# Patient Record
Sex: MALE | Race: WHITE | NOT HISPANIC OR LATINO | Employment: OTHER | URBAN - NONMETROPOLITAN AREA
[De-identification: names, ages, dates, MRNs, and addresses within clinical notes are randomized per-mention and may not be internally consistent; named-entity substitution may affect disease eponyms.]

---

## 2017-01-31 ENCOUNTER — OFFICE VISIT (OUTPATIENT)
Dept: ORTHOPEDIC SURGERY | Facility: CLINIC | Age: 52
End: 2017-01-31

## 2017-01-31 DIAGNOSIS — M25.551 RIGHT HIP PAIN: Primary | ICD-10-CM

## 2017-01-31 DIAGNOSIS — M16.31 OSTEOARTHRITIS RESULTING FROM RIGHT HIP DYSPLASIA: ICD-10-CM

## 2017-01-31 PROCEDURE — 73502 X-RAY EXAM HIP UNI 2-3 VIEWS: CPT | Performed by: ORTHOPAEDIC SURGERY

## 2017-01-31 PROCEDURE — 99203 OFFICE O/P NEW LOW 30 MIN: CPT | Performed by: ORTHOPAEDIC SURGERY

## 2017-01-31 RX ORDER — BENAZEPRIL HYDROCHLORIDE 40 MG/1
40 TABLET, FILM COATED ORAL EVERY MORNING
COMMUNITY
Start: 2017-01-26

## 2017-01-31 RX ORDER — AMLODIPINE BESYLATE 10 MG/1
10 TABLET ORAL EVERY EVENING
COMMUNITY
Start: 2017-01-26

## 2017-01-31 RX ORDER — TRAMADOL HYDROCHLORIDE 50 MG/1
50 TABLET ORAL NIGHTLY
Qty: 40 TABLET | Refills: 0 | Status: SHIPPED | OUTPATIENT
Start: 2017-01-31 | End: 2017-11-01 | Stop reason: HOSPADM

## 2017-01-31 RX ORDER — ALBUTEROL SULFATE 2.5 MG/3ML
SOLUTION RESPIRATORY (INHALATION)
COMMUNITY
Start: 2016-12-14 | End: 2017-10-30

## 2017-01-31 RX ORDER — OMEPRAZOLE 40 MG/1
40 CAPSULE, DELAYED RELEASE ORAL AS NEEDED
COMMUNITY
Start: 2016-11-29

## 2017-01-31 RX ORDER — CYCLOBENZAPRINE HCL 10 MG
10 TABLET ORAL 2 TIMES DAILY PRN
COMMUNITY
Start: 2017-01-26

## 2017-01-31 RX ORDER — GABAPENTIN 400 MG/1
400 CAPSULE ORAL 3 TIMES DAILY
COMMUNITY
Start: 2017-01-15 | End: 2018-03-19

## 2017-01-31 RX ORDER — IBUPROFEN 800 MG/1
800 TABLET ORAL EVERY 6 HOURS PRN
COMMUNITY
Start: 2016-11-30 | End: 2017-11-01 | Stop reason: HOSPADM

## 2017-01-31 RX ORDER — METOPROLOL TARTRATE 50 MG/1
50 TABLET, FILM COATED ORAL EVERY 12 HOURS SCHEDULED
COMMUNITY
Start: 2017-01-26

## 2017-01-31 RX ORDER — ACETAMINOPHEN AND CODEINE PHOSPHATE 300; 30 MG/1; MG/1
TABLET ORAL
COMMUNITY
Start: 2016-11-30 | End: 2017-10-30

## 2017-02-04 PROBLEM — M16.31 OSTEOARTHRITIS RESULTING FROM RIGHT HIP DYSPLASIA: Status: ACTIVE | Noted: 2017-02-04

## 2017-02-20 DIAGNOSIS — M16.31 OSTEOARTHRITIS RESULTING FROM RIGHT HIP DYSPLASIA: Primary | ICD-10-CM

## 2017-02-27 ENCOUNTER — APPOINTMENT (OUTPATIENT)
Dept: GENERAL RADIOLOGY | Facility: HOSPITAL | Age: 52
End: 2017-02-27
Attending: ORTHOPAEDIC SURGERY

## 2017-03-10 ENCOUNTER — HOSPITAL ENCOUNTER (OUTPATIENT)
Dept: GENERAL RADIOLOGY | Facility: HOSPITAL | Age: 52
Discharge: HOME | End: 2017-03-10
Attending: ORTHOPAEDIC SURGERY | Admitting: ORTHOPAEDIC SURGERY

## 2017-03-10 DIAGNOSIS — M16.31 OSTEOARTHRITIS RESULTING FROM RIGHT HIP DYSPLASIA: ICD-10-CM

## 2017-03-10 PROCEDURE — 25010000002 METHYLPREDNISOLONE PER 80 MG: Performed by: RADIOLOGY

## 2017-03-10 PROCEDURE — 0 IOPAMIDOL 61 % SOLUTION: Performed by: RADIOLOGY

## 2017-03-10 PROCEDURE — 77002 NEEDLE LOCALIZATION BY XRAY: CPT

## 2017-03-10 RX ORDER — LIDOCAINE WITH 8.4% SOD BICARB 0.9%(10ML)
1 SYRINGE (ML) INJECTION ONCE
Status: COMPLETED | OUTPATIENT
Start: 2017-03-10 | End: 2017-03-10

## 2017-03-10 RX ORDER — METHYLPREDNISOLONE ACETATE 80 MG/ML
80 INJECTION, SUSPENSION INTRA-ARTICULAR; INTRALESIONAL; INTRAMUSCULAR; SOFT TISSUE ONCE
Status: COMPLETED | OUTPATIENT
Start: 2017-03-10 | End: 2017-03-10

## 2017-03-10 RX ORDER — BUPIVACAINE HYDROCHLORIDE 2.5 MG/ML
10 INJECTION, SOLUTION EPIDURAL; INFILTRATION; INTRACAUDAL ONCE
Status: COMPLETED | OUTPATIENT
Start: 2017-03-10 | End: 2017-03-10

## 2017-03-10 RX ADMIN — IOPAMIDOL 1 ML: 612 INJECTION, SOLUTION INTRAVENOUS at 11:00

## 2017-03-10 RX ADMIN — METHYLPREDNISOLONE ACETATE 80 MG: 80 INJECTION, SUSPENSION INTRA-ARTICULAR; INTRALESIONAL; INTRAMUSCULAR; SOFT TISSUE at 11:00

## 2017-03-10 RX ADMIN — Medication 4 ML: at 11:00

## 2017-03-10 RX ADMIN — BUPIVACAINE HYDROCHLORIDE 5 ML: 2.5 INJECTION, SOLUTION EPIDURAL; INFILTRATION; INTRACAUDAL; PERINEURAL at 11:00

## 2017-03-23 ENCOUNTER — OFFICE VISIT (OUTPATIENT)
Dept: CARDIOLOGY | Facility: CLINIC | Age: 52
End: 2017-03-23

## 2017-03-23 VITALS
DIASTOLIC BLOOD PRESSURE: 70 MMHG | SYSTOLIC BLOOD PRESSURE: 120 MMHG | HEIGHT: 63 IN | BODY MASS INDEX: 43.3 KG/M2 | WEIGHT: 244.4 LBS | HEART RATE: 68 BPM

## 2017-03-23 DIAGNOSIS — R06.02 SHORTNESS OF BREATH: ICD-10-CM

## 2017-03-23 DIAGNOSIS — R07.2 PRECORDIAL PAIN: ICD-10-CM

## 2017-03-23 DIAGNOSIS — Z01.810 ENCOUNTER FOR PRE-OPERATIVE CARDIOVASCULAR CLEARANCE: Primary | ICD-10-CM

## 2017-03-23 DIAGNOSIS — I10 ESSENTIAL HYPERTENSION: ICD-10-CM

## 2017-03-23 DIAGNOSIS — M16.31 OSTEOARTHRITIS RESULTING FROM RIGHT HIP DYSPLASIA: ICD-10-CM

## 2017-03-23 PROCEDURE — 99243 OFF/OP CNSLTJ NEW/EST LOW 30: CPT | Performed by: NURSE PRACTITIONER

## 2017-03-23 PROCEDURE — 93000 ELECTROCARDIOGRAM COMPLETE: CPT | Performed by: NURSE PRACTITIONER

## 2017-03-23 RX ORDER — PRAVASTATIN SODIUM 40 MG
40 TABLET ORAL NIGHTLY
COMMUNITY

## 2017-03-23 RX ORDER — PHENTERMINE HYDROCHLORIDE 37.5 MG/1
TABLET ORAL
Refills: 0 | COMMUNITY
Start: 2017-03-16 | End: 2017-10-30

## 2017-03-31 ENCOUNTER — HOSPITAL ENCOUNTER (OUTPATIENT)
Dept: CARDIOLOGY | Facility: HOSPITAL | Age: 52
Discharge: HOME | End: 2017-03-31
Admitting: NURSE PRACTITIONER

## 2017-03-31 ENCOUNTER — HOSPITAL ENCOUNTER (OUTPATIENT)
Dept: CARDIOLOGY | Facility: HOSPITAL | Age: 52
Discharge: HOME | End: 2017-03-31

## 2017-03-31 VITALS
BODY MASS INDEX: 43.23 KG/M2 | OXYGEN SATURATION: 95 % | HEIGHT: 63 IN | HEART RATE: 71 BPM | SYSTOLIC BLOOD PRESSURE: 120 MMHG | DIASTOLIC BLOOD PRESSURE: 78 MMHG | WEIGHT: 244 LBS

## 2017-03-31 DIAGNOSIS — Z01.810 ENCOUNTER FOR PRE-OPERATIVE CARDIOVASCULAR CLEARANCE: ICD-10-CM

## 2017-03-31 DIAGNOSIS — I10 ESSENTIAL HYPERTENSION: ICD-10-CM

## 2017-03-31 DIAGNOSIS — R07.2 PRECORDIAL PAIN: ICD-10-CM

## 2017-03-31 DIAGNOSIS — R06.02 SHORTNESS OF BREATH: ICD-10-CM

## 2017-03-31 DIAGNOSIS — M16.31 OSTEOARTHRITIS RESULTING FROM RIGHT HIP DYSPLASIA: ICD-10-CM

## 2017-03-31 LAB
ASCENDING AORTA: 2.7 CM
BH CV ECHO MEAS - ACS: 1.9 CM
BH CV ECHO MEAS - AO MAX PG (FULL): 1.8 MMHG
BH CV ECHO MEAS - AO MAX PG: 5.9 MMHG
BH CV ECHO MEAS - AO MEAN PG (FULL): 1.3 MMHG
BH CV ECHO MEAS - AO MEAN PG: 3.7 MMHG
BH CV ECHO MEAS - AO ROOT AREA (BSA CORRECTED): 1.4
BH CV ECHO MEAS - AO ROOT AREA: 7.3 CM^2
BH CV ECHO MEAS - AO ROOT DIAM: 3 CM
BH CV ECHO MEAS - AO V2 MAX: 121.6 CM/SEC
BH CV ECHO MEAS - AO V2 MEAN: 92.5 CM/SEC
BH CV ECHO MEAS - AO V2 VTI: 27 CM
BH CV ECHO MEAS - AVA(I,A): 2.6 CM^2
BH CV ECHO MEAS - AVA(I,D): 2.6 CM^2
BH CV ECHO MEAS - AVA(V,A): 2.7 CM^2
BH CV ECHO MEAS - AVA(V,D): 2.7 CM^2
BH CV ECHO MEAS - BSA(HAYCOCK): 2.3 M^2
BH CV ECHO MEAS - BSA: 2.1 M^2
BH CV ECHO MEAS - BZI_BMI: 43.2 KILOGRAMS/M^2
BH CV ECHO MEAS - BZI_METRIC_HEIGHT: 160 CM
BH CV ECHO MEAS - BZI_METRIC_WEIGHT: 110.7 KG
BH CV ECHO MEAS - CONTRAST EF 4CH: 65 ML/M^2
BH CV ECHO MEAS - EDV(MOD-SP4): 88.3 ML
BH CV ECHO MEAS - EDV(SP4-EL): 90.6 ML
BH CV ECHO MEAS - EDV(TEICH): 144.4 ML
BH CV ECHO MEAS - EF(CUBED): 64.4 %
BH CV ECHO MEAS - EF(MOD-SP4): 65 %
BH CV ECHO MEAS - EF(SP4-EL): 66.6 %
BH CV ECHO MEAS - EF(TEICH): 55.3 %
BH CV ECHO MEAS - ESV(MOD-SP4): 30.9 ML
BH CV ECHO MEAS - ESV(SP4-EL): 30.2 ML
BH CV ECHO MEAS - ESV(TEICH): 64.5 ML
BH CV ECHO MEAS - FS: 29.1 %
BH CV ECHO MEAS - IVS/LVPW: 1.1
BH CV ECHO MEAS - IVSD: 1 CM
BH CV ECHO MEAS - LAT PEAK E' VEL: 13 CM/SEC
BH CV ECHO MEAS - LV DIASTOLIC VOL/BSA (35-75): 42 ML/M^2
BH CV ECHO MEAS - LV MASS(C)D: 203.5 GRAMS
BH CV ECHO MEAS - LV MASS(C)DI: 96.7 GRAMS/M^2
BH CV ECHO MEAS - LV MAX PG: 4.2 MMHG
BH CV ECHO MEAS - LV MEAN PG: 2.4 MMHG
BH CV ECHO MEAS - LV SYSTOLIC VOL/BSA (12-30): 14.7 ML/M^2
BH CV ECHO MEAS - LV V1 MAX: 101.9 CM/SEC
BH CV ECHO MEAS - LV V1 MEAN: 72.5 CM/SEC
BH CV ECHO MEAS - LV V1 VTI: 22.2 CM
BH CV ECHO MEAS - LVAD AP4: 29.8 CM^2
BH CV ECHO MEAS - LVAS AP4: 15.5 CM^2
BH CV ECHO MEAS - LVIDD: 5.5 CM
BH CV ECHO MEAS - LVIDS: 3.9 CM
BH CV ECHO MEAS - LVLD AP4: 8.3 CM
BH CV ECHO MEAS - LVLS AP4: 6.7 CM
BH CV ECHO MEAS - LVOT AREA (M): 3.1 CM^2
BH CV ECHO MEAS - LVOT AREA: 3.2 CM^2
BH CV ECHO MEAS - LVOT DIAM: 2 CM
BH CV ECHO MEAS - LVPWD: 0.94 CM
BH CV ECHO MEAS - MED PEAK E' VEL: 10 CM/SEC
BH CV ECHO MEAS - MR MAX PG: 10.2 MMHG
BH CV ECHO MEAS - MR MAX VEL: 159.6 CM/SEC
BH CV ECHO MEAS - MV A DUR: 0.11 SEC
BH CV ECHO MEAS - MV A MAX VEL: 81.5 CM/SEC
BH CV ECHO MEAS - MV DEC SLOPE: 655.3 CM/SEC^2
BH CV ECHO MEAS - MV DEC TIME: 0.14 SEC
BH CV ECHO MEAS - MV E MAX VEL: 87.3 CM/SEC
BH CV ECHO MEAS - MV E/A: 1.1
BH CV ECHO MEAS - MV MAX PG: 3.2 MMHG
BH CV ECHO MEAS - MV MEAN PG: 1.6 MMHG
BH CV ECHO MEAS - MV P1/2T MAX VEL: 86.3 CM/SEC
BH CV ECHO MEAS - MV P1/2T: 38.6 MSEC
BH CV ECHO MEAS - MV V2 MAX: 90.1 CM/SEC
BH CV ECHO MEAS - MV V2 MEAN: 59.4 CM/SEC
BH CV ECHO MEAS - MV V2 VTI: 24.3 CM
BH CV ECHO MEAS - MVA P1/2T LCG: 2.5 CM^2
BH CV ECHO MEAS - MVA(P1/2T): 5.7 CM^2
BH CV ECHO MEAS - MVA(VTI): 2.9 CM^2
BH CV ECHO MEAS - PA ACC TIME: 0.14 SEC
BH CV ECHO MEAS - PA MAX PG (FULL): 1.4 MMHG
BH CV ECHO MEAS - PA MAX PG: 2.7 MMHG
BH CV ECHO MEAS - PA PR(ACCEL): 15.6 MMHG
BH CV ECHO MEAS - PA V2 MAX: 82.5 CM/SEC
BH CV ECHO MEAS - PULM A REVS DUR: 0.07 SEC
BH CV ECHO MEAS - PULM A REVS VEL: 39.7 CM/SEC
BH CV ECHO MEAS - PULM DIAS VEL: 37.4 CM/SEC
BH CV ECHO MEAS - PULM S/D: 1.3
BH CV ECHO MEAS - PULM SYS VEL: 48.6 CM/SEC
BH CV ECHO MEAS - PVA(V,A): 2.6 CM^2
BH CV ECHO MEAS - PVA(V,D): 2.6 CM^2
BH CV ECHO MEAS - QP/QS: 0.66
BH CV ECHO MEAS - RAP SYSTOLE: 8 MMHG
BH CV ECHO MEAS - RV MAX PG: 1.4 MMHG
BH CV ECHO MEAS - RV MEAN PG: 0.7 MMHG
BH CV ECHO MEAS - RV V1 MAX: 58.2 CM/SEC
BH CV ECHO MEAS - RV V1 MEAN: 38.9 CM/SEC
BH CV ECHO MEAS - RV V1 VTI: 12.7 CM
BH CV ECHO MEAS - RVOT AREA: 3.6 CM^2
BH CV ECHO MEAS - RVOT DIAM: 2.2 CM
BH CV ECHO MEAS - RVSP: 14 MMHG
BH CV ECHO MEAS - SI(AO): 93.3 ML/M^2
BH CV ECHO MEAS - SI(CUBED): 49.6 ML/M^2
BH CV ECHO MEAS - SI(LVOT): 33.6 ML/M^2
BH CV ECHO MEAS - SI(MOD-SP4): 27.3 ML/M^2
BH CV ECHO MEAS - SI(SP4-EL): 28.7 ML/M^2
BH CV ECHO MEAS - SI(TEICH): 38 ML/M^2
BH CV ECHO MEAS - SUP REN AO DIAM: 2.2 CM
BH CV ECHO MEAS - SV(AO): 196.4 ML
BH CV ECHO MEAS - SV(CUBED): 104.3 ML
BH CV ECHO MEAS - SV(LVOT): 70.7 ML
BH CV ECHO MEAS - SV(MOD-SP4): 57.4 ML
BH CV ECHO MEAS - SV(RVOT): 46.4 ML
BH CV ECHO MEAS - SV(SP4-EL): 60.3 ML
BH CV ECHO MEAS - SV(TEICH): 79.9 ML
BH CV ECHO MEAS - TAPSE (>1.6): 1.9 CM2
BH CV ECHO MEAS - TR MAX VEL: 168.3 CM/SEC
BH CV NUCLEAR PRIOR STUDY: 3
BH CV STRESS BP STAGE 1: NORMAL
BH CV STRESS COMMENTS STAGE 1: NORMAL
BH CV STRESS DOSE REGADENOSON STAGE 1: 0.4
BH CV STRESS DURATION MIN STAGE 1: 0
BH CV STRESS DURATION SEC STAGE 1: 15
BH CV STRESS HR STAGE 1: 78
BH CV STRESS PROTOCOL 1: NORMAL
BH CV STRESS RECOVERY BP: NORMAL MMHG
BH CV STRESS RECOVERY HR: 77 BPM
BH CV STRESS STAGE 1: 1
BH CV XLRA - RV BASE: 2.4 CM
BH CV XLRA - TDI S': 11 CM/SEC
E/E' RATIO: 8
LEFT ATRIUM VOLUME INDEX: 27 ML/M2
LV EF 2D ECHO EST: 65 %
LV EF NUC BP: 54 %
MAXIMAL PREDICTED HEART RATE: 170 BPM
PERCENT MAX PREDICTED HR: 45.88 %
SINUS: 2.6 CM
STJ: 2.8 CM
STRESS BASELINE BP: NORMAL MMHG
STRESS BASELINE HR: 68 BPM
STRESS PERCENT HR: 54 %
STRESS POST EXERCISE DUR SEC: 15 SEC
STRESS POST PEAK BP: NORMAL MMHG
STRESS POST PEAK HR: 78 BPM
STRESS TARGET HR: 145 BPM

## 2017-03-31 PROCEDURE — 93016 CV STRESS TEST SUPVJ ONLY: CPT | Performed by: INTERNAL MEDICINE

## 2017-03-31 PROCEDURE — 78492 MYOCRD IMG PET MLT RST&STRS: CPT

## 2017-03-31 PROCEDURE — 25010000002 PERFLUTREN (DEFINITY) 8.476 MG IN SODIUM CHLORIDE 10 ML INJECTION: Performed by: NURSE PRACTITIONER

## 2017-03-31 PROCEDURE — 0 RUBIDIUM CHLORIDE: Performed by: NURSE PRACTITIONER

## 2017-03-31 PROCEDURE — 25010000002 REGADENOSON 0.4 MG/5ML SOLUTION: Performed by: NURSE PRACTITIONER

## 2017-03-31 PROCEDURE — A9555 RB82 RUBIDIUM: HCPCS | Performed by: NURSE PRACTITIONER

## 2017-03-31 PROCEDURE — 78492 MYOCRD IMG PET MLT RST&STRS: CPT | Performed by: INTERNAL MEDICINE

## 2017-03-31 PROCEDURE — 93017 CV STRESS TEST TRACING ONLY: CPT

## 2017-03-31 PROCEDURE — 93306 TTE W/DOPPLER COMPLETE: CPT | Performed by: INTERNAL MEDICINE

## 2017-03-31 PROCEDURE — 93018 CV STRESS TEST I&R ONLY: CPT | Performed by: INTERNAL MEDICINE

## 2017-03-31 PROCEDURE — C8929 TTE W OR WO FOL WCON,DOPPLER: HCPCS

## 2017-03-31 RX ADMIN — RUBIDIUM CHLORIDE RB-82 1 DOSE: 150 INJECTION, SOLUTION INTRAVENOUS at 08:40

## 2017-03-31 RX ADMIN — REGADENOSON 0.4 MG: 0.08 INJECTION, SOLUTION INTRAVENOUS at 08:54

## 2017-03-31 RX ADMIN — RUBIDIUM CHLORIDE RB-82 1 DOSE: 150 INJECTION, SOLUTION INTRAVENOUS at 08:54

## 2017-03-31 RX ADMIN — PERFLUTREN 1.5 ML: 6.52 INJECTION, SUSPENSION INTRAVENOUS at 08:53

## 2017-04-03 ENCOUNTER — TELEPHONE (OUTPATIENT)
Dept: CARDIOLOGY | Facility: CLINIC | Age: 52
End: 2017-04-03

## 2017-04-03 NOTE — TELEPHONE ENCOUNTER
Stress test results noted to be negative for ischemia.  EF 54%.  Consistent with a low risk study.  Echocardiogram shows an EF of 65% without valvular disease.  Diastolic function is normal.  Preoperative lead her stent to PRESTON Lewis

## 2017-04-25 ENCOUNTER — OFFICE VISIT (OUTPATIENT)
Dept: ORTHOPEDIC SURGERY | Facility: CLINIC | Age: 52
End: 2017-04-25

## 2017-04-25 DIAGNOSIS — M16.31 OSTEOARTHRITIS RESULTING FROM RIGHT HIP DYSPLASIA: Primary | ICD-10-CM

## 2017-04-25 PROCEDURE — 99213 OFFICE O/P EST LOW 20 MIN: CPT | Performed by: ORTHOPAEDIC SURGERY

## 2017-04-25 NOTE — PROGRESS NOTES
Chief Complaint   Patient presents with   • Right Hip - Follow-up           HPI the patient is here today for a follow up of his right hip pain and discomfort.  Patient is having a lot of pain and discomfort over the right hip.  He has difficulty with ambulation.  Difficulty going up and down steps he is doing his best to lose weight because he and I have discussed that he really noticed a decrease his BMI before we can successfully performed a hip arthroplasty for him with an anterior approach.  He also understands that the risks of complications are much higher with a high BMI.  By his own admission he currently weighs 250 pounds and has been trying to lose weight.  I am recommending to him a multidisciplinary approach including dietary modification, left ankle changes, aggressive exercise and a bariatric consultation for possible surgical intervention.  Recently he has developed a tooth infection and we have given her prescription of Augmentin and a reminder to see his dentist ASAP to get this infection addressed and healed.        There were no vitals filed for this visit.        Review of Systems   All other systems reviewed and are negative.          Physical Exam   Constitutional: He is oriented to person, place, and time. He appears well-developed and well-nourished.   HENT:   Head: Normocephalic and atraumatic.   Eyes: EOM are normal. Pupils are equal, round, and reactive to light.   Neck: Normal range of motion. Neck supple.   Cardiovascular: Intact distal pulses.    Pulmonary/Chest: Effort normal and breath sounds normal.   Abdominal: Soft. Bowel sounds are normal.   Musculoskeletal: Normal range of motion.   Neurological: He is alert and oriented to person, place, and time. He has normal reflexes.   Skin: Skin is warm.   Psychiatric: He has a normal mood and affect. His behavior is normal. Judgment and thought content normal.   Nursing note and vitals reviewed.          Joint/Body Part Specific  Exam:  right hip. Neurovascular status is intact. Patient has a distant limp on the affected side. Internal and external rotations are associated with pain and discomfort. Anterior joint line pain and tenderness is significant. Stinchfield sign is positive. Figure of 4 sign is positive. Patient is unable to perform an active straight leg raise exam. Greater trochanter is tender. Crossover adduction test is positive. Cross body adduction is limited and painful for the patient. Patient has very significant limp and joint line tenderness anteriorly, posteriorly and medially. Dorsalis pedis and posterior tibial artery pulses are palpable. Common peroneal nerve function is well preserved.      X-RAY Report:  Previous visit x-rays are reviewed and they show advanced exam osteoarthritis with bone-on-bone appearance with what appears to be a dysplastic acetabulum.      Diagnostics:        Shreyas was seen today for follow-up.    Diagnoses and all orders for this visit:    Osteoarthritis resulting from right hip dysplasia  -     FL Guide For Pain Meds Injection Joint; Future          Procedures        Plan:    I'm making an appointment for this patient with the radiologist for an injection of the right hip with the intra-articular steroid to help his reduce his symptoms.    Appointment with a bariatric surgeon to help reduce his BMI.    Aggressive exercise program.    Tablet ibuprofen 600 mg tab 1 by mouth twice a day when necessary pain and discomfort.    Patient needs to exercise aggressively to lose weight and decrease his BMI.    Tablet Augmentin 875 mg tab 1 by mouth twice a day for his tooth abscess and an appointment with his dentist for referral to an oral surgeon to address the tooth infection.    Follow-up at my office in 3 months for further considerations for surgical intervention for the hip arthroplasty.

## 2017-05-08 DIAGNOSIS — M16.31 OSTEOARTHRITIS RESULTING FROM RIGHT HIP DYSPLASIA: Primary | ICD-10-CM

## 2017-05-15 ENCOUNTER — HOSPITAL ENCOUNTER (OUTPATIENT)
Dept: GENERAL RADIOLOGY | Facility: HOSPITAL | Age: 52
Discharge: HOME | End: 2017-05-15
Attending: ORTHOPAEDIC SURGERY | Admitting: ORTHOPAEDIC SURGERY

## 2017-05-15 DIAGNOSIS — M16.31 OSTEOARTHRITIS RESULTING FROM RIGHT HIP DYSPLASIA: ICD-10-CM

## 2017-05-15 PROCEDURE — 77002 NEEDLE LOCALIZATION BY XRAY: CPT

## 2017-05-15 PROCEDURE — 0 IOPAMIDOL 61 % SOLUTION: Performed by: RADIOLOGY

## 2017-05-15 PROCEDURE — 25010000002 METHYLPREDNISOLONE PER 80 MG: Performed by: RADIOLOGY

## 2017-05-15 RX ORDER — METHYLPREDNISOLONE ACETATE 80 MG/ML
80 INJECTION, SUSPENSION INTRA-ARTICULAR; INTRALESIONAL; INTRAMUSCULAR; SOFT TISSUE ONCE
Status: COMPLETED | OUTPATIENT
Start: 2017-05-15 | End: 2017-05-15

## 2017-05-15 RX ORDER — BUPIVACAINE HYDROCHLORIDE 2.5 MG/ML
10 INJECTION, SOLUTION EPIDURAL; INFILTRATION; INTRACAUDAL ONCE
Status: COMPLETED | OUTPATIENT
Start: 2017-05-15 | End: 2017-05-15

## 2017-05-15 RX ORDER — LIDOCAINE HYDROCHLORIDE 10 MG/ML
10 INJECTION, SOLUTION INFILTRATION; PERINEURAL ONCE
Status: COMPLETED | OUTPATIENT
Start: 2017-05-15 | End: 2017-05-15

## 2017-05-15 RX ADMIN — LIDOCAINE HYDROCHLORIDE 4 ML: 10 INJECTION, SOLUTION INFILTRATION; PERINEURAL at 12:44

## 2017-05-15 RX ADMIN — IOPAMIDOL 1 ML: 612 INJECTION, SOLUTION INTRAVENOUS at 12:45

## 2017-05-15 RX ADMIN — BUPIVACAINE HYDROCHLORIDE 5 ML: 2.5 INJECTION, SOLUTION EPIDURAL; INFILTRATION; INTRACAUDAL; PERINEURAL at 12:45

## 2017-05-15 RX ADMIN — METHYLPREDNISOLONE ACETATE 80 MG: 80 INJECTION, SUSPENSION INTRA-ARTICULAR; INTRALESIONAL; INTRAMUSCULAR; SOFT TISSUE at 12:45

## 2017-10-30 ENCOUNTER — HOSPITAL ENCOUNTER (OUTPATIENT)
Dept: GENERAL RADIOLOGY | Facility: HOSPITAL | Age: 52
Discharge: HOME | End: 2017-10-30
Admitting: ORTHOPAEDIC SURGERY

## 2017-10-30 ENCOUNTER — HOSPITAL ENCOUNTER (OUTPATIENT)
Dept: GENERAL RADIOLOGY | Facility: HOSPITAL | Age: 52
Discharge: HOME | End: 2017-10-30

## 2017-10-30 ENCOUNTER — APPOINTMENT (OUTPATIENT)
Dept: PREADMISSION TESTING | Facility: HOSPITAL | Age: 52
End: 2017-10-30

## 2017-10-30 VITALS
RESPIRATION RATE: 20 BRPM | SYSTOLIC BLOOD PRESSURE: 147 MMHG | DIASTOLIC BLOOD PRESSURE: 94 MMHG | WEIGHT: 236 LBS | TEMPERATURE: 97.9 F | HEART RATE: 97 BPM | HEIGHT: 63 IN | OXYGEN SATURATION: 97 % | BODY MASS INDEX: 41.82 KG/M2

## 2017-10-30 LAB
ALBUMIN SERPL-MCNC: 4.2 G/DL (ref 3.5–5.2)
ALBUMIN/GLOB SERPL: 1.4 G/DL
ALP SERPL-CCNC: 103 U/L (ref 39–117)
ALT SERPL W P-5'-P-CCNC: 16 U/L (ref 1–41)
ANION GAP SERPL CALCULATED.3IONS-SCNC: 12 MMOL/L
APTT PPP: 29.3 SECONDS (ref 22.7–35.4)
AST SERPL-CCNC: 12 U/L (ref 1–40)
BASOPHILS # BLD AUTO: 0.03 10*3/MM3 (ref 0–0.2)
BASOPHILS NFR BLD AUTO: 0.5 % (ref 0–1.5)
BILIRUB SERPL-MCNC: 0.5 MG/DL (ref 0.1–1.2)
BILIRUB UR QL STRIP: NEGATIVE
BUN BLD-MCNC: 10 MG/DL (ref 6–20)
BUN/CREAT SERPL: 13.5 (ref 7–25)
CALCIUM SPEC-SCNC: 9.3 MG/DL (ref 8.6–10.5)
CHLORIDE SERPL-SCNC: 100 MMOL/L (ref 98–107)
CLARITY UR: CLEAR
CO2 SERPL-SCNC: 27 MMOL/L (ref 22–29)
COLOR UR: YELLOW
CREAT BLD-MCNC: 0.74 MG/DL (ref 0.76–1.27)
DEPRECATED RDW RBC AUTO: 45.9 FL (ref 37–54)
EOSINOPHIL # BLD AUTO: 1.27 10*3/MM3 (ref 0–0.7)
EOSINOPHIL NFR BLD AUTO: 22.1 % (ref 0.3–6.2)
ERYTHROCYTE [DISTWIDTH] IN BLOOD BY AUTOMATED COUNT: 13.4 % (ref 11.5–14.5)
GFR SERPL CREATININE-BSD FRML MDRD: 112 ML/MIN/1.73
GLOBULIN UR ELPH-MCNC: 2.9 GM/DL
GLUCOSE BLD-MCNC: 88 MG/DL (ref 65–99)
GLUCOSE UR STRIP-MCNC: NEGATIVE MG/DL
HCT VFR BLD AUTO: 44.6 % (ref 40.4–52.2)
HGB BLD-MCNC: 15.1 G/DL (ref 13.7–17.6)
HGB UR QL STRIP.AUTO: NEGATIVE
IMM GRANULOCYTES # BLD: 0 10*3/MM3 (ref 0–0.03)
IMM GRANULOCYTES NFR BLD: 0 % (ref 0–0.5)
INR PPP: 1 (ref 0.9–1.1)
KETONES UR QL STRIP: NEGATIVE
LEUKOCYTE ESTERASE UR QL STRIP.AUTO: NEGATIVE
LYMPHOCYTES # BLD AUTO: 1.18 10*3/MM3 (ref 0.9–4.8)
LYMPHOCYTES NFR BLD AUTO: 20.5 % (ref 19.6–45.3)
MCH RBC QN AUTO: 31.5 PG (ref 27–32.7)
MCHC RBC AUTO-ENTMCNC: 33.9 G/DL (ref 32.6–36.4)
MCV RBC AUTO: 93.1 FL (ref 79.8–96.2)
MONOCYTES # BLD AUTO: 0.27 10*3/MM3 (ref 0.2–1.2)
MONOCYTES NFR BLD AUTO: 4.7 % (ref 5–12)
NEUTROPHILS # BLD AUTO: 3 10*3/MM3 (ref 1.9–8.1)
NEUTROPHILS NFR BLD AUTO: 52.2 % (ref 42.7–76)
NITRITE UR QL STRIP: NEGATIVE
PH UR STRIP.AUTO: 6 [PH] (ref 5–8)
PLATELET # BLD AUTO: 283 10*3/MM3 (ref 140–500)
PMV BLD AUTO: 9.3 FL (ref 6–12)
POTASSIUM BLD-SCNC: 4.6 MMOL/L (ref 3.5–5.2)
PROT SERPL-MCNC: 7.1 G/DL (ref 6–8.5)
PROT UR QL STRIP: NEGATIVE
PROTHROMBIN TIME: 12.8 SECONDS (ref 11.7–14.2)
RBC # BLD AUTO: 4.79 10*6/MM3 (ref 4.6–6)
SODIUM BLD-SCNC: 139 MMOL/L (ref 136–145)
SP GR UR STRIP: 1.02 (ref 1–1.03)
UROBILINOGEN UR QL STRIP: NORMAL
WBC NRBC COR # BLD: 5.75 10*3/MM3 (ref 4.5–10.7)

## 2017-10-30 PROCEDURE — 93010 ELECTROCARDIOGRAM REPORT: CPT | Performed by: INTERNAL MEDICINE

## 2017-10-30 ASSESSMENT — HOOS JR
HOOS JR SCORE: 0
HOOS JR SCORE: 24

## 2017-10-31 ENCOUNTER — APPOINTMENT (OUTPATIENT)
Dept: GENERAL RADIOLOGY | Facility: HOSPITAL | Age: 52
End: 2017-10-31

## 2017-10-31 ENCOUNTER — HOSPITAL ENCOUNTER (INPATIENT)
Facility: HOSPITAL | Age: 52
LOS: 1 days | Discharge: HOME HEALTH | End: 2017-11-01
Attending: ORTHOPAEDIC SURGERY | Admitting: ORTHOPAEDIC SURGERY

## 2017-10-31 ENCOUNTER — ANESTHESIA EVENT (OUTPATIENT)
Dept: PERIOP | Facility: HOSPITAL | Age: 52
End: 2017-10-31

## 2017-10-31 ENCOUNTER — ANESTHESIA (OUTPATIENT)
Dept: PERIOP | Facility: HOSPITAL | Age: 52
End: 2017-10-31

## 2017-10-31 DIAGNOSIS — Z96.641 STATUS POST TOTAL REPLACEMENT OF RIGHT HIP: Primary | ICD-10-CM

## 2017-10-31 PROBLEM — M16.11 ARTHRITIS OF RIGHT HIP: Status: ACTIVE | Noted: 2017-10-31

## 2017-10-31 PROBLEM — I10 HTN (HYPERTENSION): Status: ACTIVE | Noted: 2017-10-31

## 2017-10-31 PROBLEM — J44.9 COPD (CHRONIC OBSTRUCTIVE PULMONARY DISEASE) (HCC): Status: ACTIVE | Noted: 2017-10-31

## 2017-10-31 PROBLEM — K21.9 GERD (GASTROESOPHAGEAL REFLUX DISEASE): Status: ACTIVE | Noted: 2017-10-31

## 2017-10-31 LAB
ANION GAP SERPL CALCULATED.3IONS-SCNC: 12.4 MMOL/L
BUN BLD-MCNC: 10 MG/DL (ref 6–20)
BUN/CREAT SERPL: 11.1 (ref 7–25)
CALCIUM SPEC-SCNC: 9.1 MG/DL (ref 8.6–10.5)
CHLORIDE SERPL-SCNC: 99 MMOL/L (ref 98–107)
CO2 SERPL-SCNC: 25.6 MMOL/L (ref 22–29)
CREAT BLD-MCNC: 0.9 MG/DL (ref 0.76–1.27)
GFR SERPL CREATININE-BSD FRML MDRD: 89 ML/MIN/1.73
GLUCOSE BLD-MCNC: 161 MG/DL (ref 65–99)
POTASSIUM BLD-SCNC: 4.6 MMOL/L (ref 3.5–5.2)
SODIUM BLD-SCNC: 137 MMOL/L (ref 136–145)

## 2017-10-31 PROCEDURE — 25010000002 DEXAMETHASONE PER 1 MG: Performed by: NURSE ANESTHETIST, CERTIFIED REGISTERED

## 2017-10-31 PROCEDURE — 25010000002 FENTANYL CITRATE (PF) 100 MCG/2ML SOLUTION: Performed by: NURSE ANESTHETIST, CERTIFIED REGISTERED

## 2017-10-31 PROCEDURE — 25010000002 HYDROMORPHONE PER 4 MG: Performed by: NURSE ANESTHETIST, CERTIFIED REGISTERED

## 2017-10-31 PROCEDURE — 25010000003 CEFAZOLIN IN DEXTROSE 2-4 GM/100ML-% SOLUTION: Performed by: ORTHOPAEDIC SURGERY

## 2017-10-31 PROCEDURE — C1713 ANCHOR/SCREW BN/BN,TIS/BN: HCPCS | Performed by: ORTHOPAEDIC SURGERY

## 2017-10-31 PROCEDURE — 25010000002 KETOROLAC TROMETHAMINE PER 15 MG: Performed by: NURSE ANESTHETIST, CERTIFIED REGISTERED

## 2017-10-31 PROCEDURE — 0SR903A REPLACEMENT OF RIGHT HIP JOINT WITH CERAMIC SYNTHETIC SUBSTITUTE, UNCEMENTED, OPEN APPROACH: ICD-10-PCS | Performed by: ORTHOPAEDIC SURGERY

## 2017-10-31 PROCEDURE — 73501 X-RAY EXAM HIP UNI 1 VIEW: CPT

## 2017-10-31 PROCEDURE — 94799 UNLISTED PULMONARY SVC/PX: CPT

## 2017-10-31 PROCEDURE — 97110 THERAPEUTIC EXERCISES: CPT

## 2017-10-31 PROCEDURE — 25010000002 PROPOFOL 10 MG/ML EMULSION: Performed by: NURSE ANESTHETIST, CERTIFIED REGISTERED

## 2017-10-31 PROCEDURE — C1776 JOINT DEVICE (IMPLANTABLE): HCPCS | Performed by: ORTHOPAEDIC SURGERY

## 2017-10-31 PROCEDURE — 25010000002 MIDAZOLAM PER 1 MG: Performed by: ANESTHESIOLOGY

## 2017-10-31 PROCEDURE — 25010000002 ONDANSETRON PER 1 MG: Performed by: NURSE ANESTHETIST, CERTIFIED REGISTERED

## 2017-10-31 PROCEDURE — 25010000002 SUCCINYLCHOLINE PER 20 MG: Performed by: NURSE ANESTHETIST, CERTIFIED REGISTERED

## 2017-10-31 PROCEDURE — 94640 AIRWAY INHALATION TREATMENT: CPT

## 2017-10-31 PROCEDURE — 80048 BASIC METABOLIC PNL TOTAL CA: CPT | Performed by: ORTHOPAEDIC SURGERY

## 2017-10-31 PROCEDURE — 97162 PT EVAL MOD COMPLEX 30 MIN: CPT

## 2017-10-31 DEVICE — ACTIS DUOFIX HIP PROSTHESIS (FEMORAL STEM 12/14 TAPER CEMENTLESS SIZE 6 STD COLLAR)  CE
Type: IMPLANTABLE DEVICE | Site: ACETABULUM | Status: FUNCTIONAL
Brand: ACTIS

## 2017-10-31 DEVICE — CERAMAX CERAMIC INSERT NEUTRAL 52MM OD 36MM ID
Type: IMPLANTABLE DEVICE | Site: ACETABULUM | Status: FUNCTIONAL
Brand: CERAMAX

## 2017-10-31 DEVICE — TOTL HIP STEM DEPUY UPCHRG: Type: IMPLANTABLE DEVICE | Site: ACETABULUM | Status: FUNCTIONAL

## 2017-10-31 DEVICE — PINNACLE CANCELLOUS BONE SCREW 6.5MM X 35MM
Type: IMPLANTABLE DEVICE | Site: ACETABULUM | Status: FUNCTIONAL
Brand: PINNACLE

## 2017-10-31 DEVICE — TOTL HIP GRIPTION CUP DEPUY UPCHRG: Type: IMPLANTABLE DEVICE | Site: ACETABULUM | Status: FUNCTIONAL

## 2017-10-31 DEVICE — TOTL HIP CERAMAX DEPUY 9924854: Type: IMPLANTABLE DEVICE | Site: ACETABULUM | Status: FUNCTIONAL

## 2017-10-31 DEVICE — PINNACLE GRIPTION ACETABULAR SHELL SECTOR 52MM OD
Type: IMPLANTABLE DEVICE | Site: ACETABULUM | Status: FUNCTIONAL
Brand: PINNACLE GRIPTION

## 2017-10-31 DEVICE — BIOLOX DELTA CERAMIC FEMORAL HEAD +5.0 36MM DIA 12/14 TAPER
Type: IMPLANTABLE DEVICE | Site: ACETABULUM | Status: FUNCTIONAL
Brand: BIOLOX DELTA

## 2017-10-31 RX ORDER — ROCURONIUM BROMIDE 10 MG/ML
INJECTION, SOLUTION INTRAVENOUS AS NEEDED
Status: DISCONTINUED | OUTPATIENT
Start: 2017-10-31 | End: 2017-10-31 | Stop reason: SURG

## 2017-10-31 RX ORDER — LABETALOL HYDROCHLORIDE 5 MG/ML
5 INJECTION, SOLUTION INTRAVENOUS
Status: DISCONTINUED | OUTPATIENT
Start: 2017-10-31 | End: 2017-10-31 | Stop reason: HOSPADM

## 2017-10-31 RX ORDER — SODIUM CHLORIDE 0.9 % (FLUSH) 0.9 %
1-10 SYRINGE (ML) INJECTION AS NEEDED
Status: DISCONTINUED | OUTPATIENT
Start: 2017-10-31 | End: 2017-11-01 | Stop reason: HOSPADM

## 2017-10-31 RX ORDER — NALOXONE HCL 0.4 MG/ML
0.2 VIAL (ML) INJECTION AS NEEDED
Status: DISCONTINUED | OUTPATIENT
Start: 2017-10-31 | End: 2017-10-31 | Stop reason: HOSPADM

## 2017-10-31 RX ORDER — PANTOPRAZOLE SODIUM 40 MG/1
40 TABLET, DELAYED RELEASE ORAL EVERY MORNING
Status: DISCONTINUED | OUTPATIENT
Start: 2017-11-01 | End: 2017-11-01 | Stop reason: HOSPADM

## 2017-10-31 RX ORDER — LISINOPRIL 40 MG/1
40 TABLET ORAL DAILY
Status: DISCONTINUED | OUTPATIENT
Start: 2017-10-31 | End: 2017-11-01 | Stop reason: HOSPADM

## 2017-10-31 RX ORDER — IPRATROPIUM BROMIDE AND ALBUTEROL SULFATE 2.5; .5 MG/3ML; MG/3ML
3 SOLUTION RESPIRATORY (INHALATION) EVERY 4 HOURS PRN
Status: DISCONTINUED | OUTPATIENT
Start: 2017-10-31 | End: 2017-11-01 | Stop reason: HOSPADM

## 2017-10-31 RX ORDER — FENTANYL CITRATE 50 UG/ML
50 INJECTION, SOLUTION INTRAMUSCULAR; INTRAVENOUS
Status: DISCONTINUED | OUTPATIENT
Start: 2017-10-31 | End: 2017-10-31 | Stop reason: HOSPADM

## 2017-10-31 RX ORDER — SODIUM CHLORIDE, SODIUM LACTATE, POTASSIUM CHLORIDE, CALCIUM CHLORIDE 600; 310; 30; 20 MG/100ML; MG/100ML; MG/100ML; MG/100ML
9 INJECTION, SOLUTION INTRAVENOUS CONTINUOUS
Status: DISCONTINUED | OUTPATIENT
Start: 2017-10-31 | End: 2017-10-31 | Stop reason: HOSPADM

## 2017-10-31 RX ORDER — IPRATROPIUM BROMIDE AND ALBUTEROL SULFATE 2.5; .5 MG/3ML; MG/3ML
3 SOLUTION RESPIRATORY (INHALATION) ONCE
Status: COMPLETED | OUTPATIENT
Start: 2017-10-31 | End: 2017-10-31

## 2017-10-31 RX ORDER — HYDRALAZINE HYDROCHLORIDE 20 MG/ML
5 INJECTION INTRAMUSCULAR; INTRAVENOUS
Status: DISCONTINUED | OUTPATIENT
Start: 2017-10-31 | End: 2017-10-31 | Stop reason: HOSPADM

## 2017-10-31 RX ORDER — ONDANSETRON 2 MG/ML
INJECTION INTRAMUSCULAR; INTRAVENOUS AS NEEDED
Status: DISCONTINUED | OUTPATIENT
Start: 2017-10-31 | End: 2017-10-31 | Stop reason: SURG

## 2017-10-31 RX ORDER — ACETAMINOPHEN 325 MG/1
325 TABLET ORAL EVERY 4 HOURS PRN
Status: DISCONTINUED | OUTPATIENT
Start: 2017-10-31 | End: 2017-11-01 | Stop reason: HOSPADM

## 2017-10-31 RX ORDER — NALOXONE HCL 0.4 MG/ML
0.4 VIAL (ML) INJECTION
Status: DISCONTINUED | OUTPATIENT
Start: 2017-10-31 | End: 2017-11-01 | Stop reason: HOSPADM

## 2017-10-31 RX ORDER — GLYCOPYRROLATE 0.2 MG/ML
INJECTION INTRAMUSCULAR; INTRAVENOUS AS NEEDED
Status: DISCONTINUED | OUTPATIENT
Start: 2017-10-31 | End: 2017-10-31 | Stop reason: SURG

## 2017-10-31 RX ORDER — KETOROLAC TROMETHAMINE 30 MG/ML
INJECTION, SOLUTION INTRAMUSCULAR; INTRAVENOUS AS NEEDED
Status: DISCONTINUED | OUTPATIENT
Start: 2017-10-31 | End: 2017-10-31 | Stop reason: SURG

## 2017-10-31 RX ORDER — CEFAZOLIN SODIUM 2 G/100ML
2 INJECTION, SOLUTION INTRAVENOUS ONCE
Status: DISCONTINUED | OUTPATIENT
Start: 2017-10-31 | End: 2017-10-31 | Stop reason: HOSPADM

## 2017-10-31 RX ORDER — MIDAZOLAM HYDROCHLORIDE 1 MG/ML
2 INJECTION INTRAMUSCULAR; INTRAVENOUS
Status: DISCONTINUED | OUTPATIENT
Start: 2017-10-31 | End: 2017-10-31 | Stop reason: HOSPADM

## 2017-10-31 RX ORDER — SODIUM CHLORIDE 0.9 % (FLUSH) 0.9 %
1-10 SYRINGE (ML) INJECTION AS NEEDED
Status: DISCONTINUED | OUTPATIENT
Start: 2017-10-31 | End: 2017-10-31 | Stop reason: HOSPADM

## 2017-10-31 RX ORDER — OXYCODONE HCL 10 MG/1
10 TABLET, FILM COATED, EXTENDED RELEASE ORAL ONCE
Status: COMPLETED | OUTPATIENT
Start: 2017-10-31 | End: 2017-10-31

## 2017-10-31 RX ORDER — BISACODYL 5 MG/1
10 TABLET, DELAYED RELEASE ORAL DAILY PRN
Status: DISCONTINUED | OUTPATIENT
Start: 2017-10-31 | End: 2017-11-01 | Stop reason: HOSPADM

## 2017-10-31 RX ORDER — PROMETHAZINE HYDROCHLORIDE 25 MG/ML
12.5 INJECTION, SOLUTION INTRAMUSCULAR; INTRAVENOUS ONCE AS NEEDED
Status: DISCONTINUED | OUTPATIENT
Start: 2017-10-31 | End: 2017-10-31 | Stop reason: HOSPADM

## 2017-10-31 RX ORDER — GABAPENTIN 400 MG/1
400 CAPSULE ORAL 3 TIMES DAILY
Status: DISCONTINUED | OUTPATIENT
Start: 2017-10-31 | End: 2017-11-01 | Stop reason: HOSPADM

## 2017-10-31 RX ORDER — OXYCODONE AND ACETAMINOPHEN 7.5; 325 MG/1; MG/1
1 TABLET ORAL ONCE AS NEEDED
Status: DISCONTINUED | OUTPATIENT
Start: 2017-10-31 | End: 2017-10-31 | Stop reason: HOSPADM

## 2017-10-31 RX ORDER — DIPHENHYDRAMINE HYDROCHLORIDE 50 MG/ML
12.5 INJECTION INTRAMUSCULAR; INTRAVENOUS
Status: DISCONTINUED | OUTPATIENT
Start: 2017-10-31 | End: 2017-10-31 | Stop reason: HOSPADM

## 2017-10-31 RX ORDER — SUCCINYLCHOLINE CHLORIDE 20 MG/ML
INJECTION INTRAMUSCULAR; INTRAVENOUS AS NEEDED
Status: DISCONTINUED | OUTPATIENT
Start: 2017-10-31 | End: 2017-10-31 | Stop reason: SURG

## 2017-10-31 RX ORDER — PROPOFOL 10 MG/ML
VIAL (ML) INTRAVENOUS AS NEEDED
Status: DISCONTINUED | OUTPATIENT
Start: 2017-10-31 | End: 2017-10-31 | Stop reason: SURG

## 2017-10-31 RX ORDER — CEFAZOLIN SODIUM 2 G/100ML
2 INJECTION, SOLUTION INTRAVENOUS EVERY 8 HOURS
Status: COMPLETED | OUTPATIENT
Start: 2017-10-31 | End: 2017-10-31

## 2017-10-31 RX ORDER — ONDANSETRON 2 MG/ML
4 INJECTION INTRAMUSCULAR; INTRAVENOUS ONCE AS NEEDED
Status: DISCONTINUED | OUTPATIENT
Start: 2017-10-31 | End: 2017-10-31 | Stop reason: HOSPADM

## 2017-10-31 RX ORDER — DOCUSATE SODIUM 100 MG/1
100 CAPSULE, LIQUID FILLED ORAL 2 TIMES DAILY PRN
Status: DISCONTINUED | OUTPATIENT
Start: 2017-10-31 | End: 2017-11-01 | Stop reason: HOSPADM

## 2017-10-31 RX ORDER — PROMETHAZINE HYDROCHLORIDE 25 MG/1
25 TABLET ORAL ONCE AS NEEDED
Status: DISCONTINUED | OUTPATIENT
Start: 2017-10-31 | End: 2017-10-31 | Stop reason: HOSPADM

## 2017-10-31 RX ORDER — FLUMAZENIL 0.1 MG/ML
0.2 INJECTION INTRAVENOUS AS NEEDED
Status: DISCONTINUED | OUTPATIENT
Start: 2017-10-31 | End: 2017-10-31 | Stop reason: HOSPADM

## 2017-10-31 RX ORDER — HYDROCODONE BITARTRATE AND ACETAMINOPHEN 7.5; 325 MG/1; MG/1
1 TABLET ORAL ONCE AS NEEDED
Status: DISCONTINUED | OUTPATIENT
Start: 2017-10-31 | End: 2017-10-31 | Stop reason: HOSPADM

## 2017-10-31 RX ORDER — TRANEXAMIC ACID 100 MG/ML
INJECTION, SOLUTION INTRAVENOUS AS NEEDED
Status: DISCONTINUED | OUTPATIENT
Start: 2017-10-31 | End: 2017-10-31 | Stop reason: SURG

## 2017-10-31 RX ORDER — ACETAMINOPHEN 500 MG
1000 TABLET ORAL ONCE
Status: COMPLETED | OUTPATIENT
Start: 2017-10-31 | End: 2017-10-31

## 2017-10-31 RX ORDER — HYDROMORPHONE HYDROCHLORIDE 1 MG/ML
0.5 INJECTION, SOLUTION INTRAMUSCULAR; INTRAVENOUS; SUBCUTANEOUS
Status: DISCONTINUED | OUTPATIENT
Start: 2017-10-31 | End: 2017-10-31 | Stop reason: HOSPADM

## 2017-10-31 RX ORDER — PROMETHAZINE HYDROCHLORIDE 25 MG/1
25 SUPPOSITORY RECTAL ONCE AS NEEDED
Status: DISCONTINUED | OUTPATIENT
Start: 2017-10-31 | End: 2017-10-31 | Stop reason: HOSPADM

## 2017-10-31 RX ORDER — EPHEDRINE SULFATE 50 MG/ML
INJECTION, SOLUTION INTRAVENOUS AS NEEDED
Status: DISCONTINUED | OUTPATIENT
Start: 2017-10-31 | End: 2017-10-31 | Stop reason: SURG

## 2017-10-31 RX ORDER — FENTANYL CITRATE 50 UG/ML
INJECTION, SOLUTION INTRAMUSCULAR; INTRAVENOUS AS NEEDED
Status: DISCONTINUED | OUTPATIENT
Start: 2017-10-31 | End: 2017-10-31 | Stop reason: SURG

## 2017-10-31 RX ORDER — BENAZEPRIL HYDROCHLORIDE 20 MG/1
40 TABLET ORAL DAILY
Status: DISCONTINUED | OUTPATIENT
Start: 2017-10-31 | End: 2017-10-31 | Stop reason: CLARIF

## 2017-10-31 RX ORDER — MIDAZOLAM HYDROCHLORIDE 1 MG/ML
1 INJECTION INTRAMUSCULAR; INTRAVENOUS
Status: DISCONTINUED | OUTPATIENT
Start: 2017-10-31 | End: 2017-10-31 | Stop reason: HOSPADM

## 2017-10-31 RX ORDER — FAMOTIDINE 10 MG/ML
20 INJECTION, SOLUTION INTRAVENOUS ONCE
Status: COMPLETED | OUTPATIENT
Start: 2017-10-31 | End: 2017-10-31

## 2017-10-31 RX ORDER — ACETAMINOPHEN 325 MG/1
650 TABLET ORAL EVERY 4 HOURS PRN
Status: DISCONTINUED | OUTPATIENT
Start: 2017-10-31 | End: 2017-11-01 | Stop reason: HOSPADM

## 2017-10-31 RX ORDER — CYCLOBENZAPRINE HCL 10 MG
10 TABLET ORAL 3 TIMES DAILY PRN
Status: DISCONTINUED | OUTPATIENT
Start: 2017-10-31 | End: 2017-11-01 | Stop reason: HOSPADM

## 2017-10-31 RX ORDER — EPHEDRINE SULFATE 50 MG/ML
5 INJECTION, SOLUTION INTRAVENOUS ONCE AS NEEDED
Status: DISCONTINUED | OUTPATIENT
Start: 2017-10-31 | End: 2017-10-31 | Stop reason: HOSPADM

## 2017-10-31 RX ORDER — DIPHENHYDRAMINE HCL 25 MG
25 CAPSULE ORAL EVERY 6 HOURS PRN
Status: DISCONTINUED | OUTPATIENT
Start: 2017-10-31 | End: 2017-11-01 | Stop reason: HOSPADM

## 2017-10-31 RX ORDER — ALBUTEROL SULFATE 2.5 MG/3ML
2.5 SOLUTION RESPIRATORY (INHALATION) ONCE AS NEEDED
Status: DISCONTINUED | OUTPATIENT
Start: 2017-10-31 | End: 2017-10-31 | Stop reason: HOSPADM

## 2017-10-31 RX ORDER — CEFAZOLIN SODIUM 2 G/100ML
2 INJECTION, SOLUTION INTRAVENOUS ONCE
Status: COMPLETED | OUTPATIENT
Start: 2017-10-31 | End: 2017-10-31

## 2017-10-31 RX ORDER — LIDOCAINE HYDROCHLORIDE 20 MG/ML
INJECTION, SOLUTION INFILTRATION; PERINEURAL AS NEEDED
Status: DISCONTINUED | OUTPATIENT
Start: 2017-10-31 | End: 2017-10-31 | Stop reason: SURG

## 2017-10-31 RX ORDER — METOPROLOL TARTRATE 50 MG/1
100 TABLET, FILM COATED ORAL ONCE
Status: COMPLETED | OUTPATIENT
Start: 2017-10-31 | End: 2017-10-31

## 2017-10-31 RX ORDER — HYDROMORPHONE HCL 110MG/55ML
PATIENT CONTROLLED ANALGESIA SYRINGE INTRAVENOUS AS NEEDED
Status: DISCONTINUED | OUTPATIENT
Start: 2017-10-31 | End: 2017-10-31 | Stop reason: SURG

## 2017-10-31 RX ORDER — SODIUM CHLORIDE 9 MG/ML
100 INJECTION, SOLUTION INTRAVENOUS CONTINUOUS
Status: ACTIVE | OUTPATIENT
Start: 2017-10-31 | End: 2017-11-01

## 2017-10-31 RX ORDER — DEXAMETHASONE SODIUM PHOSPHATE 10 MG/ML
INJECTION INTRAMUSCULAR; INTRAVENOUS AS NEEDED
Status: DISCONTINUED | OUTPATIENT
Start: 2017-10-31 | End: 2017-10-31 | Stop reason: SURG

## 2017-10-31 RX ORDER — HYDROCODONE BITARTRATE AND ACETAMINOPHEN 7.5; 325 MG/1; MG/1
1 TABLET ORAL EVERY 4 HOURS PRN
Status: DISCONTINUED | OUTPATIENT
Start: 2017-10-31 | End: 2017-11-01 | Stop reason: HOSPADM

## 2017-10-31 RX ORDER — ASPIRIN 325 MG
325 TABLET, DELAYED RELEASE (ENTERIC COATED) ORAL EVERY 12 HOURS SCHEDULED
Status: DISCONTINUED | OUTPATIENT
Start: 2017-11-01 | End: 2017-11-01 | Stop reason: HOSPADM

## 2017-10-31 RX ORDER — METOPROLOL TARTRATE 50 MG/1
50 TABLET, FILM COATED ORAL DAILY
Status: DISCONTINUED | OUTPATIENT
Start: 2017-10-31 | End: 2017-11-01 | Stop reason: HOSPADM

## 2017-10-31 RX ORDER — PROMETHAZINE HYDROCHLORIDE 25 MG/1
12.5 TABLET ORAL ONCE AS NEEDED
Status: DISCONTINUED | OUTPATIENT
Start: 2017-10-31 | End: 2017-10-31 | Stop reason: HOSPADM

## 2017-10-31 RX ORDER — AMLODIPINE BESYLATE 10 MG/1
10 TABLET ORAL DAILY
Status: DISCONTINUED | OUTPATIENT
Start: 2017-10-31 | End: 2017-11-01 | Stop reason: HOSPADM

## 2017-10-31 RX ADMIN — ONDANSETRON 4 MG: 2 INJECTION INTRAMUSCULAR; INTRAVENOUS at 11:13

## 2017-10-31 RX ADMIN — GLYCOPYRROLATE 0.3 MG: 0.2 INJECTION INTRAMUSCULAR; INTRAVENOUS at 11:12

## 2017-10-31 RX ADMIN — LIDOCAINE HYDROCHLORIDE 100 MG: 20 INJECTION, SOLUTION INFILTRATION; PERINEURAL at 10:00

## 2017-10-31 RX ADMIN — PROPOFOL 180 MG: 10 INJECTION, EMULSION INTRAVENOUS at 10:00

## 2017-10-31 RX ADMIN — SODIUM CHLORIDE, POTASSIUM CHLORIDE, SODIUM LACTATE AND CALCIUM CHLORIDE 9 ML/HR: 600; 310; 30; 20 INJECTION, SOLUTION INTRAVENOUS at 08:38

## 2017-10-31 RX ADMIN — SUCCINYLCHOLINE CHLORIDE 100 MG: 20 INJECTION, SOLUTION INTRAMUSCULAR; INTRAVENOUS; PARENTERAL at 10:00

## 2017-10-31 RX ADMIN — FAMOTIDINE 20 MG: 10 INJECTION INTRAVENOUS at 08:38

## 2017-10-31 RX ADMIN — EPHEDRINE SULFATE 10 MG: 50 INJECTION INTRAMUSCULAR; INTRAVENOUS; SUBCUTANEOUS at 11:14

## 2017-10-31 RX ADMIN — GABAPENTIN 400 MG: 400 CAPSULE ORAL at 21:48

## 2017-10-31 RX ADMIN — HYDROMORPHONE HYDROCHLORIDE 0.5 MG: 1 INJECTION, SOLUTION INTRAMUSCULAR; INTRAVENOUS; SUBCUTANEOUS at 12:07

## 2017-10-31 RX ADMIN — SODIUM CHLORIDE, POTASSIUM CHLORIDE, SODIUM LACTATE AND CALCIUM CHLORIDE: 600; 310; 30; 20 INJECTION, SOLUTION INTRAVENOUS at 10:41

## 2017-10-31 RX ADMIN — CEFAZOLIN SODIUM 2 G: 2 INJECTION, SOLUTION INTRAVENOUS at 21:48

## 2017-10-31 RX ADMIN — ROCURONIUM BROMIDE 25 MG: 10 INJECTION INTRAVENOUS at 10:05

## 2017-10-31 RX ADMIN — FENTANYL CITRATE 50 MCG: 50 INJECTION INTRAMUSCULAR; INTRAVENOUS at 09:58

## 2017-10-31 RX ADMIN — CEFAZOLIN SODIUM 2 G: 2 INJECTION, SOLUTION INTRAVENOUS at 09:50

## 2017-10-31 RX ADMIN — KETOROLAC TROMETHAMINE 30 MG: 30 INJECTION, SOLUTION INTRAMUSCULAR; INTRAVENOUS at 11:27

## 2017-10-31 RX ADMIN — GLYCOPYRROLATE 0.2 MG: 0.2 INJECTION INTRAMUSCULAR; INTRAVENOUS at 09:58

## 2017-10-31 RX ADMIN — EPHEDRINE SULFATE 10 MG: 50 INJECTION INTRAMUSCULAR; INTRAVENOUS; SUBCUTANEOUS at 10:23

## 2017-10-31 RX ADMIN — OXYCODONE HYDROCHLORIDE 10 MG: 10 TABLET, FILM COATED, EXTENDED RELEASE ORAL at 07:21

## 2017-10-31 RX ADMIN — EPHEDRINE SULFATE 10 MG: 50 INJECTION INTRAMUSCULAR; INTRAVENOUS; SUBCUTANEOUS at 10:52

## 2017-10-31 RX ADMIN — EPHEDRINE SULFATE 10 MG: 50 INJECTION INTRAMUSCULAR; INTRAVENOUS; SUBCUTANEOUS at 10:43

## 2017-10-31 RX ADMIN — EPHEDRINE SULFATE 10 MG: 50 INJECTION INTRAMUSCULAR; INTRAVENOUS; SUBCUTANEOUS at 11:19

## 2017-10-31 RX ADMIN — FENTANYL CITRATE 50 MCG: 50 INJECTION INTRAMUSCULAR; INTRAVENOUS at 12:32

## 2017-10-31 RX ADMIN — ROCURONIUM BROMIDE 15 MG: 10 INJECTION INTRAVENOUS at 10:34

## 2017-10-31 RX ADMIN — METOPROLOL TARTRATE 50 MG: 50 TABLET, FILM COATED ORAL at 23:44

## 2017-10-31 RX ADMIN — ROCURONIUM BROMIDE 5 MG: 10 INJECTION INTRAVENOUS at 10:00

## 2017-10-31 RX ADMIN — FENTANYL CITRATE 50 MCG: 50 INJECTION INTRAMUSCULAR; INTRAVENOUS at 12:01

## 2017-10-31 RX ADMIN — HYDROMORPHONE HYDROCHLORIDE 0.5 MG: 2 INJECTION, SOLUTION INTRAMUSCULAR; INTRAVENOUS; SUBCUTANEOUS at 11:10

## 2017-10-31 RX ADMIN — MIDAZOLAM 2 MG: 1 INJECTION INTRAMUSCULAR; INTRAVENOUS at 08:38

## 2017-10-31 RX ADMIN — SUGAMMADEX 200 MG: 100 INJECTION, SOLUTION INTRAVENOUS at 11:12

## 2017-10-31 RX ADMIN — GABAPENTIN 400 MG: 400 CAPSULE ORAL at 15:57

## 2017-10-31 RX ADMIN — SODIUM CHLORIDE 100 ML/HR: 9 INJECTION, SOLUTION INTRAVENOUS at 15:58

## 2017-10-31 RX ADMIN — HYDROCODONE BITARTRATE AND ACETAMINOPHEN 1 TABLET: 7.5; 325 TABLET ORAL at 23:45

## 2017-10-31 RX ADMIN — IPRATROPIUM BROMIDE AND ALBUTEROL SULFATE 3 ML: .5; 3 SOLUTION RESPIRATORY (INHALATION) at 08:50

## 2017-10-31 RX ADMIN — ACETAMINOPHEN 1000 MG: 500 TABLET ORAL at 07:21

## 2017-10-31 RX ADMIN — METOPROLOL TARTRATE 100 MG: 50 TABLET, FILM COATED ORAL at 08:42

## 2017-10-31 RX ADMIN — HYDROCODONE BITARTRATE AND ACETAMINOPHEN 1 TABLET: 7.5; 325 TABLET ORAL at 14:37

## 2017-10-31 RX ADMIN — DEXAMETHASONE SODIUM PHOSPHATE 8 MG: 10 INJECTION INTRAMUSCULAR; INTRAVENOUS at 10:28

## 2017-10-31 RX ADMIN — CEFAZOLIN SODIUM 2 G: 2 INJECTION, SOLUTION INTRAVENOUS at 15:57

## 2017-10-31 RX ADMIN — TRANEXAMIC ACID 1000 MG: 100 INJECTION, SOLUTION INTRAVENOUS at 10:14

## 2017-10-31 RX ADMIN — HYDROCODONE BITARTRATE AND ACETAMINOPHEN 1 TABLET: 7.5; 325 TABLET ORAL at 19:21

## 2017-10-31 NOTE — ANESTHESIA POSTPROCEDURE EVALUATION
"Patient: Shreyas Lao    Procedure Summary     Date Anesthesia Start Anesthesia Stop Room / Location    10/31/17 0949 1147  [facility] /  [facility]       Procedure Diagnosis Surgeon Provider    RIGHT TOTAL HIP ARTHROPLASTY POSTERIOR (Right Hip) Osteoarthritis of right hip MD Jayesh Bonilla MD          Anesthesia Type: general  Last vitals  BP   141/93 (10/31/17 1230)   Temp   36.6 °C (97.9 °F) (10/31/17 1146)   Pulse   62 (10/31/17 1230)   Resp   16 (10/31/17 1230)     SpO2   96 % (10/31/17 1230)     Post Anesthesia Care and Evaluation    Patient location during evaluation: PACU  Patient participation: complete - patient participated  Level of consciousness: awake and alert  Pain management: adequate  Airway patency: patent  Anesthetic complications: No anesthetic complications    Cardiovascular status: acceptable  Respiratory status: acceptable  Hydration status: acceptable    Comments: /93 (BP Location: Right arm, Patient Position: Lying)  Pulse 62  Temp 36.6 °C (97.9 °F) (Oral)   Resp 16  Ht 63\" (160 cm)  Wt 236 lb 6.4 oz (107 kg)  SpO2 96%  BMI 41.88 kg/m2              "

## 2017-10-31 NOTE — ANESTHESIA PROCEDURE NOTES
Airway  Urgency: elective    Airway not difficult    General Information and Staff    Patient location during procedure: OR  Anesthesiologist: SRUTHI CORDERO  CRNA: OSMAR FLORES    Indications and Patient Condition  Indications for airway management: airway protection    Preoxygenated: yes  MILS maintained throughout  Mask difficulty assessment: 1 - vent by mask    Final Airway Details  Final airway type: endotracheal airway      Successful airway: ETT  Cuffed: yes   Successful intubation technique: direct laryngoscopy  Facilitating devices/methods: cricoid pressure  Endotracheal tube insertion site: oral  Blade: Cr  Blade size: #3  ETT size: 8.0 mm  Cormack-Lehane Classification: grade I - full view of glottis  Placement verified by: chest auscultation and capnometry   Inital cuff pressure (cm H2O): 22  Cuff volume (mL): 8  Measured from: lips  ETT to lips (cm): 22  Number of attempts at approach: 1

## 2017-10-31 NOTE — ANESTHESIA PREPROCEDURE EVALUATION
Anesthesia Evaluation     Patient summary reviewed and Nursing notes reviewed          Airway   Mallampati: III  possible difficult intubation  Dental    (+) poor dentition        Pulmonary    (+) pneumonia , a smoker Current, shortness of breath, wheezes,   Cardiovascular     Rhythm: regular    (+) hypertension, hyperlipidemia    ROS comment: Normal cath    Neuro/Psych  GI/Hepatic/Renal/Endo    (+) morbid obesity, GERD,     Musculoskeletal     Abdominal    Substance History      OB/GYN          Other                                    Anesthesia Plan    ASA 3     general     intravenous induction   Anesthetic plan and risks discussed with patient.    · Myocardial perfusion imaging indicates a normal myocardial perfusion study with no evidence of ischemia.  · Left ventricular ejection fraction is normal (Calculated EF = 54%).  · Impressions are consistent with a low risk study.

## 2017-11-01 VITALS
WEIGHT: 236.4 LBS | DIASTOLIC BLOOD PRESSURE: 78 MMHG | TEMPERATURE: 98 F | RESPIRATION RATE: 16 BRPM | OXYGEN SATURATION: 96 % | BODY MASS INDEX: 41.89 KG/M2 | SYSTOLIC BLOOD PRESSURE: 123 MMHG | HEIGHT: 63 IN | HEART RATE: 67 BPM

## 2017-11-01 PROBLEM — M16.11 ARTHRITIS OF RIGHT HIP: Status: RESOLVED | Noted: 2017-10-31 | Resolved: 2017-11-01

## 2017-11-01 LAB
ANION GAP SERPL CALCULATED.3IONS-SCNC: 14.8 MMOL/L
BASOPHILS # BLD AUTO: 0.01 10*3/MM3 (ref 0–0.2)
BASOPHILS NFR BLD AUTO: 0.1 % (ref 0–1.5)
BUN BLD-MCNC: 10 MG/DL (ref 6–20)
BUN/CREAT SERPL: 13.7 (ref 7–25)
CALCIUM SPEC-SCNC: 9 MG/DL (ref 8.6–10.5)
CHLORIDE SERPL-SCNC: 97 MMOL/L (ref 98–107)
CO2 SERPL-SCNC: 25.2 MMOL/L (ref 22–29)
CREAT BLD-MCNC: 0.73 MG/DL (ref 0.76–1.27)
DEPRECATED RDW RBC AUTO: 46.4 FL (ref 37–54)
EOSINOPHIL # BLD AUTO: 0.06 10*3/MM3 (ref 0–0.7)
EOSINOPHIL NFR BLD AUTO: 0.6 % (ref 0.3–6.2)
ERYTHROCYTE [DISTWIDTH] IN BLOOD BY AUTOMATED COUNT: 13.6 % (ref 11.5–14.5)
GFR SERPL CREATININE-BSD FRML MDRD: 114 ML/MIN/1.73
GLUCOSE BLD-MCNC: 138 MG/DL (ref 65–99)
HCT VFR BLD AUTO: 37.4 % (ref 40.4–52.2)
HGB BLD-MCNC: 12.3 G/DL (ref 13.7–17.6)
IMM GRANULOCYTES # BLD: 0.03 10*3/MM3 (ref 0–0.03)
IMM GRANULOCYTES NFR BLD: 0.3 % (ref 0–0.5)
LYMPHOCYTES # BLD AUTO: 0.68 10*3/MM3 (ref 0.9–4.8)
LYMPHOCYTES NFR BLD AUTO: 6.9 % (ref 19.6–45.3)
MCH RBC QN AUTO: 30.8 PG (ref 27–32.7)
MCHC RBC AUTO-ENTMCNC: 32.9 G/DL (ref 32.6–36.4)
MCV RBC AUTO: 93.5 FL (ref 79.8–96.2)
MONOCYTES # BLD AUTO: 0.82 10*3/MM3 (ref 0.2–1.2)
MONOCYTES NFR BLD AUTO: 8.3 % (ref 5–12)
NEUTROPHILS # BLD AUTO: 8.32 10*3/MM3 (ref 1.9–8.1)
NEUTROPHILS NFR BLD AUTO: 83.8 % (ref 42.7–76)
PLATELET # BLD AUTO: 292 10*3/MM3 (ref 140–500)
PMV BLD AUTO: 9.6 FL (ref 6–12)
POTASSIUM BLD-SCNC: 4.6 MMOL/L (ref 3.5–5.2)
RBC # BLD AUTO: 4 10*6/MM3 (ref 4.6–6)
SODIUM BLD-SCNC: 137 MMOL/L (ref 136–145)
WBC NRBC COR # BLD: 9.92 10*3/MM3 (ref 4.5–10.7)

## 2017-11-01 PROCEDURE — 97150 GROUP THERAPEUTIC PROCEDURES: CPT

## 2017-11-01 PROCEDURE — 25010000002 INFLUENZA VAC SUBUNIT QUAD 0.5 ML SUSPENSION PREFILLED SYRINGE: Performed by: ORTHOPAEDIC SURGERY

## 2017-11-01 PROCEDURE — 90661 CCIIV3 VAC ABX FR 0.5 ML IM: CPT | Performed by: ORTHOPAEDIC SURGERY

## 2017-11-01 PROCEDURE — G0008 ADMIN INFLUENZA VIRUS VAC: HCPCS | Performed by: ORTHOPAEDIC SURGERY

## 2017-11-01 PROCEDURE — 80048 BASIC METABOLIC PNL TOTAL CA: CPT | Performed by: ORTHOPAEDIC SURGERY

## 2017-11-01 PROCEDURE — 97165 OT EVAL LOW COMPLEX 30 MIN: CPT

## 2017-11-01 PROCEDURE — 85025 COMPLETE CBC W/AUTO DIFF WBC: CPT | Performed by: ORTHOPAEDIC SURGERY

## 2017-11-01 PROCEDURE — 97110 THERAPEUTIC EXERCISES: CPT

## 2017-11-01 PROCEDURE — 94799 UNLISTED PULMONARY SVC/PX: CPT

## 2017-11-01 RX ORDER — HYDROCODONE BITARTRATE AND ACETAMINOPHEN 7.5; 325 MG/1; MG/1
1 TABLET ORAL EVERY 4 HOURS PRN
Qty: 72 TABLET | Refills: 0 | Status: SHIPPED | OUTPATIENT
Start: 2017-11-01 | End: 2017-11-11

## 2017-11-01 RX ADMIN — HYDROCODONE BITARTRATE AND ACETAMINOPHEN 1 TABLET: 7.5; 325 TABLET ORAL at 05:47

## 2017-11-01 RX ADMIN — HYDROCODONE BITARTRATE AND ACETAMINOPHEN 1 TABLET: 7.5; 325 TABLET ORAL at 10:02

## 2017-11-01 RX ADMIN — PANTOPRAZOLE SODIUM 40 MG: 40 TABLET, DELAYED RELEASE ORAL at 08:10

## 2017-11-01 RX ADMIN — GABAPENTIN 400 MG: 400 CAPSULE ORAL at 08:49

## 2017-11-01 RX ADMIN — ASPIRIN 325 MG: 325 TABLET, DELAYED RELEASE ORAL at 08:49

## 2017-11-01 RX ADMIN — METOPROLOL TARTRATE 50 MG: 50 TABLET, FILM COATED ORAL at 08:49

## 2017-11-01 RX ADMIN — A/SINGAPORE/GP1908/2015 IVR-180 (H1N1) (AN A/MICHIGAN/45/2015-LIKE VIRUS), A/SINGAPORE/GP2050/2015 (H3N2) (AN A/HONG KONG/4801/2014 - LIKE VIRUS), B/UTAH/9/2014 (A B/PHUKET/3073/2013-LIKE VIRUS), B/HONG KONG/259/2010 (A B/BRISBANE/60/08-LIKE VIRUS) 0.5 ML: 15; 15; 15; 15 INJECTION, SUSPENSION INTRAMUSCULAR at 11:12

## 2017-11-01 NOTE — THERAPY DISCHARGE NOTE
Acute Care - Occupational Therapy Initial Eval/Discharge  [facility]     Patient Name: Shreyas Lao  : 1965  MRN: [id number]  Today's Date: 2017  Onset of Illness/Injury or Date of Surgery Date: 10/31/17 (s/p R RAUL)     Referring Physician: Dr. Valdez      Admit Date: 10/31/2017       ICD-10-CM ICD-9-CM   1. 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   • COPD (chronic obstructive pulmonary disease)   • HTN (hypertension)   • GERD (gastroesophageal reflux disease)     Past Medical History:   Diagnosis Date   • Abdominal pain    • Acid reflux    • Benign essential hypertension    • Bradycardia    • Chest pain    • COPD (chronic obstructive pulmonary disease)    • Cough    • Dyslipidemia    • Fatigue    • Hip dysplasia    • Hyperlipidemia    • Hypertension    • Infection of skin    • Infection of tooth    • Obesity    • Osteoarthritis    • Physical exam    • Pleurisy    • Pneumonia    • Preoperative clearance    • Radicular pain of right lower extremity    • Right hip pain    • Risk factors for obstructive sleep apnea    • Sciatic pain    • SOB (shortness of breath)    • Tobacco abuse    • Weight gain, abnormal      Past Surgical History:   Procedure Laterality Date   • CARDIAC CATHETERIZATION  2010    Completed by Dr. Osei Stevens which showed no significant stenosis of any of the coronary arteries   • TOTAL HIP ARTHROPLASTY Right 10/31/2017    Procedure: RIGHT TOTAL HIP ARTHROPLASTY POSTERIOR;  Surgeon: Jenna Valdez MD;  Location: [facility];  Service:           OT ASSESSMENT FLOWSHEET (last 72 hours)      OT Evaluation       17 0956 17 0949 17 0916 17 0915 17 0420    Rehab Evaluation    Document Type  evaluation  -HC evaluation  -HC      Subjective Information  agree to therapy;no complaints  -HC agree to therapy;no complaints  -HC       Patient Effort, Rehab Treatment  good  -HC good  -HC      Symptoms Noted During/After Treatment  none  -HC none  -HC      General Information    Patient Profile Review  yes  -HC yes  -HC      General Observations  reclined in bedside chair in no acute distress  -HC reclined in bedside chair in no acute distress  -HC      Precautions/Limitations  fall precautions;hip precautions- left  -HC fall precautions;hip precautions- right  -HC      Prior Level of Function  independent:;ADL's  -HC independent:;ADL's  -HC      Equipment Currently Used at Home  walker, standard  -HC walker, standard  -HC  walker, standard  -QN    Plans/Goals Discussed With  spouse/S.O.  -HC spouse/S.O.  -HC      Risks Reviewed  spouse/S.O.:  -HC spouse/S.O.:  -HC      Benefits Reviewed  spouse/S.O.:  -HC spouse/S.O.:  -HC      Living Environment    Lives With  spouse  -HC spouse  -HC spouse  -VA     Living Arrangements  house  -HC house  - house  -VA     Home Accessibility  no concerns  -HC no concerns  -HC no concerns  -VA     Number of Stairs to Enter Home  1  -HC 1  -HC 1  -VA     Stair Railings at Home  outside, present on left side  - outside, present on left side  - outside, present on left side  -VA     Type of Financial/Environmental Concern  none  -HC none  -HC none  -VA     Transportation Available  car;family or friend will provide  - car;family or friend will provide  - car;family or friend will provide  -VA car;family or friend will provide  -QN    Clinical Impression    Patient/Family Goals Statement  to return with [facility] to return home with [facility]      Criteria for Skilled Therapeutic Interventions Met  no  -HC no  -HC      Anticipated Discharge Disposition home with home health  -HC home with home health  - home with home health  -      Functional Level Prior    Ambulation   1-->assistive equipment  -VA      Transferring   0-->independent  -VA      Toileting   0-->independent  -VA      Bathing    0-->independent  -VA      Dressing   0-->independent  -VA      Eating   0-->independent  -VA      Communication   0-->understands/communicates without difficulty  -VA      Swallowing   0-->swallows foods/liquids without difficulty  -VA      Pain Assessment    Pain Assessment  No/denies pain  -       Vision Assessment/Intervention    Visual Impairment  WFL  -       Cognitive Assessment/Intervention    Current Cognitive/Communication Assessment  functional  -       Orientation Status  oriented x 4  -HC       Follows Commands/Answers Questions  100% of the time  -       Personal Safety  WNL/WFL  -HC       Personal Safety Interventions  fall prevention program maintained;gait belt;nonskid shoes/slippers when out of bed  -       ROM (Range of Motion)    General ROM Detail  E [facility]  -       MMT (Manual Muscle Testing)    General MMT Assessment Detail  E [facility]  -       Bed Mobility, Assessment/Treatment    Bed Mobility, Comment  up in chair  -       Lower Body Bathing Assessment/Training    LB Bathing Assess/Train, Comment  pt and spouse educated on hip kit and requested OT order one. Pt's spouse will be available for assist but pt reports he wants to be as independent as possible. Pt able to demo good carryover of education of AE.  -HC       Positioning and Restraints    Pre-Treatment Position  sitting in chair/recliner  -HC       Post Treatment Position  chair  -HC       In Chair  reclined;call light within reach;encouraged to call for assist;with family/caregiver;RLE elevated  -         10/31/17 1500 10/31/17 1336 10/31/17 1335 10/31/17 0722 10/31/17 0702    Rehab Evaluation    Document Type evaluation  -MA        Subjective Information agree to therapy;complains of;fatigue;weakness   drowsiness noted  -MA        Patient Effort, Rehab Treatment good  -MA        Symptoms Noted During/After Treatment increased pain  -MA        General Information    Patient Profile Review yes  -MA        Onset of  Illness/Injury or Date of Surgery Date 10/31/17   s/p R RAUL  -MA        Referring Physician Dr. Valdez  -MA        General Observations supine in bed with HOB elevated, SCD, abductor pillow, no acute distress noted  -MA        Precautions/Limitations fall precautions;hip precautions- right  -MA        Prior Level of Function independent:;all household mobility;using stairs  -MA        Equipment Currently Used at Home cane, straight  -MA  cane, straight  -JT cane, straight  -DK cane, straight  -DK    Plans/Goals Discussed With patient;spouse/S.O.  -MA        Barriers to Rehab none identified  -MA        Living Environment    Lives With  spouse  -JT   spouse  -DK    Living Arrangements  house  -JT       Home Accessibility  stairs to enter home  -JT   stairs within home  -DK    Number of Stairs to Enter Home  1  -JT   1  -DK    Stair Railings at Home  outside, present on left side  -JT   none  -DK    Type of Financial/Environmental Concern  none  -JT   none  -DK    Transportation Available  car;family or friend will provide  -JT   car;family or friend will provide  -DK    Living Environment Comment no HR for step  -MA        Functional Level Prior    Ambulation   1-->assistive equipment  -JT  1-->assistive equipment  -DK    Transferring   1-->assistive equipment  -JT  0-->independent  -DK    Toileting   0-->independent  -JT  0-->independent  -DK    Bathing   0-->independent  -JT  0-->independent  -DK    Dressing   0-->independent  -JT  0-->independent  -DK    Eating   0-->independent  -JT  0-->independent  -DK    Communication   0-->understands/communicates without difficulty  -JT  0-->understands/communicates without difficulty  -DK    Swallowing   0-->swallows foods/liquids without difficulty  -JT  0-->swallows foods/liquids without difficulty  -DK    Vital Signs    Pretreatment Heart Rate (beats/min) 63  -MA        Posttreatment Heart Rate (beats/min) 64  -MA        Pre SpO2 (%) 93  -MA        O2 Delivery Pre  Treatment room air  -MA        Post SpO2 (%) 94  -MA        O2 Delivery Post Treatment room air  -MA        Pain Assessment    Pain Assessment 0-10  -MA        Pain Score 8  -MA        Pain Type Surgical pain  -MA        Pain Location Hip  -MA        Pain Orientation Right  -MA        Pain Intervention(s) Cold applied;Repositioned;Ambulation/increased activity;Rest  -MA        Response to Interventions tolerated  -MA        Vision Assessment/Intervention    Visual Impairment WFL  -MA        Cognitive Assessment/Intervention    Current Cognitive/Communication Assessment functional  -MA        Orientation Status oriented x 4  -MA        Follows Commands/Answers Questions 100% of the time;able to follow multi-step instructions  -MA        Personal Safety WNL/WFL;good awareness, safety precautions  -MA        Personal Safety Interventions fall prevention program maintained;gait belt;nonskid shoes/slippers when out of bed  -MA        ROM (Range of Motion)    General ROM Detail R LE limited due to soreness/pain  -MA        MMT (Manual Muscle Testing)    General MMT Assessment Detail R LE post op weakness  -MA        Mobility Assessment/Training    Extremity Weight-Bearing Status right lower extremity  -MA        Right Lower Extremity Weight-Bearing weight-bearing as tolerated  -MA        Bed Mobility, Assessment/Treatment    Bed Mobility, Assistive Device bed rails;head of bed elevated  -MA        Bed Mobility, Scoot/Bridge, [place] contact guard assist  -MA        Bed Mob, Supine to Sit, [place] contact guard assist  -MA        Bed Mobility, Safety Issues decreased use of legs for bridging/pushing;impaired trunk control for bed mobility  -MA        Bed Mobility, Impairments strength decreased;pain  -MA        Bed Mobility, Comment VC for hand placement  -MA        Transfer Assessment/Treatment    Transfers, Sit-Stand [place] contact guard assist  -MA        Transfers, Stand-Sit [place] contact guard  assist  -MA        Transfers, Sit-Stand-Sit, Assist Device rolling walker  -MA        Transfer, Safety Issues sequencing ability decreased  -MA        Transfer, Impairments ROM decreased;strength decreased;impaired balance;pain  -MA        Transfer, Comment VC for hand placement  -MA        Therapy Exercises    Exercise Protocols total hip  -MA        Total Hip Exercises right:;10 reps;completed protocol  -MA        Positioning and Restraints    Pre-Treatment Position in bed  -MA        Post Treatment Position chair  -MA        In Chair notified nsg;sitting;call light within reach;encouraged to call for assist;with family/caregiver  -MA          10/30/17 1217                Living Environment    Lives With spouse  -TT        Living Arrangements house  -TT        Home Accessibility stairs to enter home  -TT        Stair Railings at Home none  -TT        Type of Financial/Environmental Concern none  -TT        Transportation Available car;family or friend will provide  -TT        Functional Level Prior    Ambulation 1-->assistive equipment   CANE  -TT          User Key  (r) = Recorded By, (t) = Taken By, (c) = Cosigned By    Initials Name Effective Dates    DK Rosmery Abad RN 06/16/16 -     [place] Susi Mendoza, RN 07/28/16 -     QN Queen TARAN Trujillo RN 06/16/16 -     JBAYRON Moyer RN 06/16/16 -     TT Mary Suárez RN 06/16/16 -     MAGDALENA Estrella, PT 12/13/16 -     HC Hayde Allan OTR 08/17/17 -           Occupational Therapy Education     Title: PT OT SLP Therapies (Active)     Topic: Occupational Therapy (Done)     Point: ADL training (Done)    Description: Instruct learner(s) on proper safety adaptation and remediation techniques during self care or transfers.   Instruct in proper use of assistive devices.    Learning Progress Summary    Learner Readiness Method Response Comment Documented by Status   Patient Eager E [facility] 11/01/17 0953 Done   Family Eager E [facility] 11/01/17 0953 Done                Point: Precautions (Done)    Description: Instruct learner(s) on prescribed precautions during self-care and functional transfers.    Learning Progress Summary    Learner Readiness Method Response Comment Documented by Status   Patient Eager E [facility] 11/01/17 0953 Done   Family Eager E [facility] 11/01/17 0953 Done               Point: Body mechanics (Done)    Description: Instruct learner(s) on proper positioning and spine alignment during self-care, functional mobility activities and/or exercises.    Learning Progress Summary    Learner Readiness Method Response Comment Documented by Status   Patient Eager E [facility] 11/01/17 0953 Done   Family Eager E [facility] 11/01/17 0953 Done                      User Key     Initials Effective Dates Name Provider Type Discipline     08/17/17 -  Hayde Allan OTR Occupational Therapist OT                OT Recommendation and Plan  Anticipated Discharge Disposition: home with home health  Plan of Care Review  Plan Of Care Reviewed With: patient, spouse  Outcome Summary/Follow up Plan: Pt presented to OT eval alert and oriented x4. Pt presents with functional deficits however is safe to d/c home with HH and intermittent assist from spouse. Both spouse and pt educated on hip kit AE and was able to demo good understanding and carryover and expressed interest in AE. Pt and spouse provided with info on how to order. No further OT warranted at this time, will sign off.           OT Goals       11/01/17 0953          Patient Education OT LTG    Patient Education OT LTG, Date Established 11/01/17  -      Patient Education OT LTG, Time to Achieve 1 wk  -HC      Patient Education OT LTG, Education Type adaptive equipment mgmt  -HC      Patient Education OT LTG, Additional Goal Pt and spouse educated on hip kit AE and was able to demo good understanding of use.  -HC        User Key  (r) = Recorded By, (t) = Taken By, (c) = Cosigned By    Initials Name Provider Type    HC Hayde Alaln  OTR Occupational Therapist                Outcome Measures       11/01/17 0900 10/31/17 1500       How much help from another person do you currently need...    Turning from your back to your side while in flat bed without using bedrails?  4  -MA     Moving from lying on back to sitting on the side of a flat bed without bedrails?  3  -MA     Moving to and from a bed to a chair (including a wheelchair)?  3  -MA     Standing up from a chair using your arms (e.g., wheelchair, bedside chair)?  3  -MA     Climbing 3-5 steps with a railing?  3  -MA     To walk in hospital room?  3  -MA     AM-PAC 6 Clicks Score  19  -MA     How much help from another is currently needed...    Putting on and taking off regular lower body clothing? 3  -HC      Bathing (including washing, rinsing, and drying) 3  -HC      Toileting (which includes using toilet bed pan or urinal) 3  -HC      Putting on and taking off regular upper body clothing 4  -HC      Taking care of personal grooming (such as brushing teeth) 4  -HC      Eating meals 4  -HC      Score 21  -      Functional Assessment    Outcome Measure Options AM-PAC 6 Clicks Daily Activity (OT)  - AM-PAC 6 Clicks Basic Mobility (PT)  -MA       User Key  (r) = Recorded By, (t) = Taken By, (c) = Cosigned By    Initials Name Provider Type    MA Susanna Estrella, PT Physical Therapist     DANIELA Garcia Occupational Therapist          Time Calculation:         Time Calculation- OT       11/01/17 0956          Time Calculation- OT    OT Start Time 0910  -      OT Stop Time 0923  -      OT Time Calculation (min) 13 min  -      OT Received On 11/01/17  -        User Key  (r) = Recorded By, (t) = Taken By, (c) = Cosigned By    Initials Name Provider Type     DANIELA Garcia Occupational Therapist          Therapy Charges for Today     Code Description Service Date Service Provider Modifiers Qty    [id number]  OT EVAL LOW COMPLEXITY 2 11/1/2017 DANIELA Garcia GO 1                OT Discharge Summary  Anticipated Discharge Disposition: home with home health    DANIELA Garcia  11/1/2017

## 2017-11-01 NOTE — PROGRESS NOTES
Orthopedic Progress Note      Patient: Shreyas Lao  YOB: 1965     Date of Admission: 10/31/2017  6:18 AM Medical Record Number: [id number]     Attending Physician: Jenna Valdez, *    Status Post:  RIGHT TOTAL HIP ARTHROPLASTY POSTERIOR Post Operative Day Number: 1    Subjective : No new orthopaedic complaints     Pain Relief: some relief with present medication.     Systemic Complaints: No Complaints  Vitals:    10/31/17 2319 10/31/17 2344 11/01/17 0246 11/01/17 0554   BP: 124/70 124/70 136/82 127/80   BP Location: Left arm  Left arm Right arm   Patient Position: Lying  Lying Lying   Pulse: 65 74 69 72   Resp: 17 18 18   Temp: 97.9 °F (36.6 °C)  98 °F (36.7 °C) 97 °F (36.1 °C)   TempSrc: Oral  Oral Oral   SpO2: 96%  96% 95%   Weight:       Height:           Physical Exam: 51 y.o. male    General Appearance:       Alert, cooperative, in no acute distress                  Extremities:    Dressing Clean, Dry and Intact         Incision healthy without signs or symptoms of infections         No clinical sign of DVT        Able to do good movements of digits    Pulses:   Pulses palpable and equal bilaterally           Diagnostic Tests:       Results from last 7 days  Lab Units 11/01/17  0321 10/30/17  1208   WBC 10*3/mm3 9.92 5.75   HEMOGLOBIN g/dL 12.3* 15.1   HEMATOCRIT % 37.4* 44.6   PLATELETS 10*3/mm3 292 283       Results from last 7 days  Lab Units 11/01/17  0321 10/31/17  1436 10/30/17  1208   SODIUM mmol/L 137 137 139   POTASSIUM mmol/L 4.6 4.6 4.6   CHLORIDE mmol/L 97* 99 100   CO2 mmol/L 25.2 25.6 27.0   BUN mg/dL 10 10 10   CREATININE mg/dL 0.73* 0.90 0.74*   GLUCOSE mg/dL 138* 161* 88   CALCIUM mg/dL 9.0 9.1 9.3       Results from last 7 days  Lab Units 10/30/17  1208   INR  1.00   APTT seconds 29.3     No results found for: CRYSTAL]  No results found for: CULTURE]  No results found for: URICACID]  Xr Chest Pa & Lateral    Result Date: 10/30/2017  Narrative: TWO-VIEW CHEST   HISTORY: Preop for hip surgery. Hypertension.  FINDINGS: The lungs are well-expanded and clear and the heart and hilar structures are normal. There is no acute disease.  TWO-VIEW RIGHT HIP AND ONE VIEW AP PELVIS  HISTORY: Right hip pain. Preop for surgery.  FINDINGS: There are extremely severe degenerative changes involving the right hip more than left with marked joint space narrowing and flattening deformity of the femoral head combined with subchondral cystic change and sclerosis.  This report was finalized on 10/30/2017 5:47 PM by Dr. Homar Phan MD.      Xr Hip With Or Without Pelvis 1 View Right    Result Date: 10/31/2017  Narrative: ONE VIEW PORTABLE RIGHT HIP  HISTORY: Hip replacement for osteoarthritis.  The patient has had recent total hip replacement and the alignment appears satisfactory.  This report was finalized on 10/31/2017 1:21 PM by Dr. Homar Phan MD.      Xr Hip With Or Without Pelvis 2 - 3 View Right    Result Date: 10/30/2017  Narrative: TWO-VIEW CHEST  HISTORY: Preop for hip surgery. Hypertension.  FINDINGS: The lungs are well-expanded and clear and the heart and hilar structures are normal. There is no acute disease.  TWO-VIEW RIGHT HIP AND ONE VIEW AP PELVIS  HISTORY: Right hip pain. Preop for surgery.  FINDINGS: There are extremely severe degenerative changes involving the right hip more than left with marked joint space narrowing and flattening deformity of the femoral head combined with subchondral cystic change and sclerosis.  This report was finalized on 10/30/2017 5:47 PM by Dr. Homar Phan MD.          Current Medications:  Scheduled Meds:  amLODIPine 10 mg Oral Daily   aspirin 325 mg Oral Q12H   gabapentin 400 mg Oral TID   lisinopril 40 mg Oral Daily   metoprolol tartrate 50 mg Oral Daily   pantoprazole 40 mg Oral QAM     Continuous Infusions:  sodium chloride 100 mL/hr Last Rate: 100 mL/hr (10/31/17 1558)     PRN Meds:.•  acetaminophen  •  acetaminophen  •  bisacodyl  •   cyclobenzaprine  •  diphenhydrAMINE  •  docusate sodium  •  HYDROcodone-acetaminophen  •  influenza vaccine  •  ipratropium-albuterol  •  Morphine **AND** naloxone  •  sodium chloride    Assessment: Status post  RIGHT TOTAL HIP ARTHROPLASTY POSTERIOR    Patient Active Problem List   Diagnosis   • Osteoarthritis resulting from right hip dysplasia   • Arthritis of right hip   • COPD (chronic obstructive pulmonary disease)   • HTN (hypertension)   • GERD (gastroesophageal reflux disease)       PLAN:   Continues current post-op course  Anticoagulation: Aspirin started   Dressing Change in today  Mobilize with PT as tolerated per protocol    Weight Bearing: WBAT  Discharge Plan: OK to plan for discharge in  today to home and home health  from orthopadic perspective.    Jenna Valdez MD    Date: 11/1/2017    Time: 8:35 AM

## 2017-11-01 NOTE — PLAN OF CARE
Problem: Patient Care Overview (Adult)  Goal: Plan of Care Review  Outcome: Ongoing (interventions implemented as appropriate)    11/01/17 0420   Coping/Psychosocial Response Interventions   Plan Of Care Reviewed With patient   Patient Care Overview   Progress improving   Outcome Evaluation   Outcome Summary/Follow up Plan PO PAIN MEDICATION HELPING WITH PAIN. AMBULATING FINE WITH 1 ASSIST. VOIDING FINE. EDUCATION PROVIDED ON THE IMPORTANCE OF USING BRONCHODILATORS AS ORDERED BY MD TO HELP IMPROVE BREATHING.       Goal: Adult Individualization and Mutuality  Outcome: Ongoing (interventions implemented as appropriate)  Goal: Discharge Needs Assessment  Outcome: Ongoing (interventions implemented as appropriate)    Problem: Perioperative Period (Adult)  Goal: Signs and Symptoms of Listed Potential Problems Will be Absent or Manageable (Perioperative Period)  Outcome: Ongoing (interventions implemented as appropriate)    Problem: Fall Risk (Adult)  Goal: Absence of Falls  Outcome: Ongoing (interventions implemented as appropriate)    Problem: Hip Replacement, Total (Adult)  Goal: Signs and Symptoms of Listed Potential Problems Will be Absent or Manageable (Hip Replacement, Total)  Outcome: Ongoing (interventions implemented as appropriate)

## 2017-11-01 NOTE — DISCHARGE SUMMARY
Orthopedic Discharge Summary      Patient: Shreyas Lao    YOB: 1965    Medical Record Number: [id number]    Attending Physician: Jenna Valdez, *  Consulting Physician(s):   Date of Admission: 10/31/2017  6:18 AM  Date of Discharge:     RIGHT TOTAL HIP ARTHROPLASTY POSTERIOR    Patient Active Problem List   Diagnosis   • Osteoarthritis resulting from right hip dysplasia   • Arthritis of right hip   • COPD (chronic obstructive pulmonary disease)   • HTN (hypertension)   • GERD (gastroesophageal reflux disease)        No Known Allergies     Shreyas Lao   Home Medication Instructions BRITTANY:[id number]    Printed on:11/01/17 0838   Medication Information                      amLODIPine (NORVASC) 10 MG tablet  Take 10 mg by mouth Daily.             aspirin  MG EC tablet  Take 1 tablet by mouth Every 12 (Twelve) Hours for 42 doses.             benazepril (LOTENSIN) 40 MG tablet  Take 40 mg by mouth Daily.             cyclobenzaprine (FLEXERIL) 10 MG tablet  Take 10 mg by mouth 3 (Three) Times a Day As Needed.             gabapentin (NEURONTIN) 400 MG capsule  Take 400 mg by mouth 3 (Three) Times a Day.             HYDROcodone-acetaminophen (NORCO) 7.5-325 MG per tablet  Take 1 tablet by mouth Every 4 (Four) Hours As Needed for Moderate Pain  for up to 10 days.             metoprolol tartrate (LOPRESSOR) 50 MG tablet  Take 50 mg by mouth Daily.             omeprazole (priLOSEC) 40 MG capsule  Take 40 mg by mouth Daily.             pravastatin (PRAVACHOL) 40 MG tablet  Take 40 mg by mouth Every Night.             traMADol (ULTRAM) 50 MG tablet  Take 1 tablet by mouth Every Night.                      Past Medical History:   Diagnosis Date   • Abdominal pain    • Acid reflux    • Benign essential hypertension    • Bradycardia    • Chest pain    • COPD (chronic obstructive pulmonary disease)    • Cough    • Dyslipidemia    • Fatigue    • Hip dysplasia    • Hyperlipidemia    • Hypertension     • Infection of skin    • Infection of tooth    • Obesity    • Osteoarthritis    • Physical exam    • Pleurisy    • Pneumonia    • Preoperative clearance    • Radicular pain of right lower extremity    • Right hip pain    • Risk factors for obstructive sleep apnea    • Sciatic pain    • SOB (shortness of breath)    • Tobacco abuse    • Weight gain, abnormal         Past Surgical History:   Procedure Laterality Date   • CARDIAC CATHETERIZATION  01/02/2010    Completed by Dr. Osei Stevens which showed no significant stenosis of any of the coronary arteries        Social History     Occupational History   • Not on file.     Social History Main Topics   • Smoking status: Heavy Tobacco Smoker     Packs/day: 1.00     Years: 30.00     Types: Cigarettes   • Smokeless tobacco: Never Used      Comment: since age 12   • Alcohol use No   • Drug use: Yes     Special: Marijuana      Comment: OCCAS   • Sexual activity: Defer      Social History     Social History Narrative        Family History   Problem Relation Age of Onset   • Heart disease Mother    • Heart disease Father    • Cancer Father    • Cancer Paternal Grandfather    • Diabetes Other    • Stroke Other    • Malig Hyperthermia Neg Hx        Physical Exam: 51 y.o. male  General Appearance:    Alert, cooperative, in no acute distress                      Vitals:    10/31/17 2319 10/31/17 2344 11/01/17 0246 11/01/17 0554   BP: 124/70 124/70 136/82 127/80   BP Location: Left arm  Left arm Right arm   Patient Position: Lying  Lying Lying   Pulse: 65 74 69 72   Resp: 17  18 18   Temp: 97.9 °F (36.6 °C)  98 °F (36.7 °C) 97 °F (36.1 °C)   TempSrc: Oral  Oral Oral   SpO2: 96%  96% 95%   Weight:       Height:            Hospital Course:  51 y.o. male admitted to [facility] to services of Ashley Bonilla  on 10/31/2017 and underwent a right total hip arthroplasty Per Сергей Us MD. Antibiotic  Kefzol 2 gm every 8 hours and VTE prophylaxis  Aspirin  325 mg every 12 hours orally  were per [facility] protocols. Post-operatively the patient transferred to the post-operative floor where the patient underwent mobilization therapy that included active ROM exercises. Opioids were titrated to achieve appropriate pain management to allow for participation in mobilization exercises. Vital signs are now stable. The incision is intact without signs or symptoms of infection. Operative extremity neurovascular status remains intact.   Appropriate education re: incision care, activity levels, medications, hip dislocation precautions, and follow up visits was completed and all questions were answered. The patient is now deemed stable for discharge.    DIAGNOSTIC TESTS:     Admission on 10/31/2017   Component Date Value Ref Range Status   • Glucose 10/31/2017 161* 65 - 99 mg/dL Final   • BUN 10/31/2017 10  6 - 20 mg/dL Final   • Creatinine 10/31/2017 0.90  0.76 - 1.27 mg/dL Final   • Sodium 10/31/2017 137  136 - 145 mmol/L Final   • Potassium 10/31/2017 4.6  3.5 - 5.2 mmol/L Final   • Chloride 10/31/2017 99  98 - 107 mmol/L Final   • CO2 10/31/2017 25.6  22.0 - 29.0 mmol/L Final   • Calcium 10/31/2017 9.1  8.6 - 10.5 mg/dL Final   • eGFR Non African Amer 10/31/2017 89  >60 mL/min/1.73 Final   • BUN/Creatinine Ratio 10/31/2017 11.1  7.0 - 25.0 Final   • Anion Gap 10/31/2017 12.4  mmol/L Final   • Glucose 11/01/2017 138* 65 - 99 mg/dL Final   • BUN 11/01/2017 10  6 - 20 mg/dL Final   • Creatinine 11/01/2017 0.73* 0.76 - 1.27 mg/dL Final   • Sodium 11/01/2017 137  136 - 145 mmol/L Final   • Potassium 11/01/2017 4.6  3.5 - 5.2 mmol/L Final   • Chloride 11/01/2017 97* 98 - 107 mmol/L Final   • CO2 11/01/2017 25.2  22.0 - 29.0 mmol/L Final   • Calcium 11/01/2017 9.0  8.6 - 10.5 mg/dL Final   • eGFR Non African Amer 11/01/2017 114  >60 mL/min/1.73 Final   • BUN/Creatinine Ratio 11/01/2017 13.7  7.0 - 25.0 Final   • Anion Gap 11/01/2017 14.8  mmol/L Final   • WBC 11/01/2017 9.92  4.50 - 10.70  10*3/mm3 Final   • RBC 11/01/2017 4.00* 4.60 - 6.00 10*6/mm3 Final   • Hemoglobin 11/01/2017 12.3* 13.7 - 17.6 g/dL Final   • Hematocrit 11/01/2017 37.4* 40.4 - 52.2 % Final   • MCV 11/01/2017 93.5  79.8 - 96.2 fL Final   • MCH 11/01/2017 30.8  27.0 - 32.7 pg Final   • MCHC 11/01/2017 32.9  32.6 - 36.4 g/dL Final   • RDW 11/01/2017 13.6  11.5 - 14.5 % Final   • RDW-SD 11/01/2017 46.4  37.0 - 54.0 fl Final   • MPV 11/01/2017 9.6  6.0 - 12.0 fL Final   • Platelets 11/01/2017 292  140 - 500 10*3/mm3 Final   • Neutrophil % 11/01/2017 83.8* 42.7 - 76.0 % Final   • Lymphocyte % 11/01/2017 6.9* 19.6 - 45.3 % Final   • Monocyte % 11/01/2017 8.3  5.0 - 12.0 % Final   • Eosinophil % 11/01/2017 0.6  0.3 - 6.2 % Final   • Basophil % 11/01/2017 0.1  0.0 - 1.5 % Final   • Immature Grans % 11/01/2017 0.3  0.0 - 0.5 % Final   • Neutrophils, Absolute 11/01/2017 8.32* 1.90 - 8.10 10*3/mm3 Final   • Lymphocytes, Absolute 11/01/2017 0.68* 0.90 - 4.80 10*3/mm3 Final   • Monocytes, Absolute 11/01/2017 0.82  0.20 - 1.20 10*3/mm3 Final   • Eosinophils, Absolute 11/01/2017 0.06  0.00 - 0.70 10*3/mm3 Final   • Basophils, Absolute 11/01/2017 0.01  0.00 - 0.20 10*3/mm3 Final   • Immature Grans, Absolute 11/01/2017 0.03  0.00 - 0.03 10*3/mm3 Final       Xr Chest Pa & Lateral    Result Date: 10/30/2017  Narrative: TWO-VIEW CHEST  HISTORY: Preop for hip surgery. Hypertension.  FINDINGS: The lungs are well-expanded and clear and the heart and hilar structures are normal. There is no acute disease.  TWO-VIEW RIGHT HIP AND ONE VIEW AP PELVIS  HISTORY: Right hip pain. Preop for surgery.  FINDINGS: There are extremely severe degenerative changes involving the right hip more than left with marked joint space narrowing and flattening deformity of the femoral head combined with subchondral cystic change and sclerosis.  This report was finalized on 10/30/2017 5:47 PM by Dr. Homar Phan MD.      Xr Hip With Or Without Pelvis 1 View Right    Result Date:  10/31/2017  Narrative: ONE VIEW PORTABLE RIGHT HIP  HISTORY: Hip replacement for osteoarthritis.  The patient has had recent total hip replacement and the alignment appears satisfactory.  This report was finalized on 10/31/2017 1:21 PM by Dr. Homar Phan MD.      Xr Hip With Or Without Pelvis 2 - 3 View Right    Result Date: 10/30/2017  Narrative: TWO-VIEW CHEST  HISTORY: Preop for hip surgery. Hypertension.  FINDINGS: The lungs are well-expanded and clear and the heart and hilar structures are normal. There is no acute disease.  TWO-VIEW RIGHT HIP AND ONE VIEW AP PELVIS  HISTORY: Right hip pain. Preop for surgery.  FINDINGS: There are extremely severe degenerative changes involving the right hip more than left with marked joint space narrowing and flattening deformity of the femoral head combined with subchondral cystic change and sclerosis.  This report was finalized on 10/30/2017 5:47 PM by Dr. Homar Phan MD.        Discharge and Follow up Instructions:     I. ACTIVITIES:  1. Exercises:  ? Complete exercise program as taught by the hospital physical therapist 2 times per day  ? Exercise program will be advanced by the physical therapist  ? During the day be up ambulating every 2 hours (while awake) for short distances  ? Complete the ankle pump exercises at least 10 times per hour (while awake)  ? Elevate legs when in bed and for at least 30 minutes during the day.Use cold packs 20-30 minutes approximately 5 times per day. This should be done before and after completing your exercises and at any time you are experiencing pain/ stiffness in your operative extremity.      2. Activities of Daily Living:  ? No tub baths, hot tubs, or swimming pools for 4 weeks  ? May shower and let water run over the incision on post-operative day #5 if no drainage. Do not scrub or rub the incision. Simply let the water run over the incision and pat dry.    II. Restrictions  ? Continue Posterior hip precautions as taught at  the hospital  ? Your surgeon will discuss with you when you will be able to drive again. Usual guidelines are you are to be off pain medications prior to driving.  ? Weight bearing is as tolerated  ? First week stay inside on even terrain. May go up and down stairs one stair at a time utilizing the hand rail once cleared by physical therapy to do so.  ? After one week, you may venture outside (if cleared to do so by physical therapist).    III. Precautions:  ? Everyone that comes near you should wash their hands  ? No elective dental, genital-urinary, or colon procedures or surgical procedures for 12 weeks after surgery unless absolutely necessary.  ?  If dental work or surgical procedure is deemed absolutely necessary, you will need to contact your surgeon as you will need to take antibiotics 1 hour prior to any dental work (including teeth cleanings).  ? Please discuss with your surgeon prophylactic antibiotics as the length of time this intervention will be necessary for you varies with each patient’s health history and situation.  ? Avoid sick people. If you must be around someone who is ill, they should wear a mask.  ? Avoid visits to the Emergency Room or Urgent Care. If you feel you need to go to the Emergency Room, please notify your Surgeon's office.  ? Stockings are to be worn for one week after surgery and are to be placed on in the morning and removed at night. Observe your skin when stocking is removed for any problems. Monitor the stockings to ensure that any swelling is not causing the stockings to become too tight. In this case, remove stockings immediately.      IV. INCISION CARE:  ? Wash your hands prior to dressing changes  ? Change the dressing as needed to keep incision clean and dry. Utilize dry gauze and paper tape. Avoid touching the side of the gauze that goes against the incision with your hands.  ? No creams or ointments to the incision  ? May remove dressing once the incision is free of  drainage  ? Do not touch or pick at the incision  ? Check incision every day and notify surgeon immediately if any of the following signs or symptoms are noted:  o Increase in redness  o Increase in swelling around the incision and of the entire extremity  o Increase in pain  o Drainage oozing from the incision  o Pulling apart of the edges of the incision  o Increase in overall body temperature (greater than 100.5 degrees)  ? Your Staples will be removed between 10-14 days postoperation.  This may be done by either the home health nurse, rehabilitation nurse or during your return visit to Dr. Valdez's office.  You will then be instructed on how to care for the incision.  (Please call the office if your staples have not been removed within 14 days after surgery).       V. Medications:   1. Anticoagulants: You will be discharged on an anticoagulant. This is a prophylactic medication that helps prevent blood clots during your post-operative period.  You will be on  Aspirin 325 mg Enteric Coated every 12 hours  for  21 days.   ? While taking the anticoagulant, you should avoid taking any additional aspirin, ibuprofen (Advil or Motrin), Aleve (Naprosyn) or other non-steroidal anti-inflammatory medications.   ? Notify surgeon immediately if any shahana bleeding is noted in the urine, stool, emesis, or from the nose or the incision. Blood in the stool will often appear as black rather than red. Blood in urine may appear as pink. Blood in emesis may appear as brown/black like coffee grounds.  ? You will need to apply pressure for longer periods of time to any cuts or abrasions to stop bleeding  ? Avoid alcohol while taking anticoagulants    2. Stool Softeners: You will be at greater risk of constipation after surgery due to being less mobile and the pain medications.   ? Take stool softeners as instructed by your surgeon while on pain medications. Over the counter Colace 100 mg 1-2 capsules twice daily.   ? If stools  become too loose or too frequent, please decreases the dosage or stop the stool softener.  ? If constipation occurs despite use of stool softeners, you are to continue the stool softeners and add a laxative (Milk of Magnesia 1 ounce daily as needed).  ? Dulcolax oral tabs or suppository, or a fleets enema can also be utilized for constipation and can be obtained over the counter.   ? If above interventions are unsuccessful in inducing bowel movements, please contact your surgeon's office / family physician's office.  ? Drink plenty of fluids, and eat fruits and vegetables during your recovery time    3. Pain Medications utilized after surgery are narcotics and the law requires that the following information be given to all patients that are prescribed narcotics:  ? CLASSIFICATION: Pain medications are called Opioids and are narcotics  ? LEGALITIES: It is illegal to share narcotics with others and to drive within 24 hours of taking narcotics  ? POTENTIAL SIDE EFFECTS: Potential side effects of opioids include: nausea, vomiting, itching, dizziness, drowsiness, dry mouth, constipation, and difficulty urinating.  ? POTENTIAL ADVERSE EFFECTS:   o Opioid tolerance can develop with use of pain medications and this simply means that it requires more and more of the medication to control pain; however, this is seen more in patients that use opioids for longer periods of time.  o Opioid dependence can develop with use of Opioids and this simply means that to stop the medication can cause withdrawal symptoms; however, this is seen with patients that use Opioids for longer periods of time.  o Opioid addiction can develop with use of Opioids and the incidence of this is very unlikely in patients who take the medications as ordered and stop the medications as instructed.  o Opioid overdose can be dangerous, but is unlikely when the medication is taken as ordered and stopped when ordered. It is important not to mix opioids with  alcohol or with and type of sedative such as Benadryl as this can lead to over sedation and respiratory difficulty.  ? DOSAGE:   o Pain medications will need to be taken consistently for the first week to decrease pain and promote adequate pain relief and participation in physical therapy.  o After the initial surgical pain begins to resolve, you may begin to decrease the pain medication. By the end of 6 weeks, you should be off of pain medications.  o Refills will not be given by the office during evening hours, on weekends, or after 6 weeks post-op.  o To seek refills on pain medications during the initial 6 week post-operative period, you must call the office 48 hours in advance to request the refill. The office will then notify you when to  the prescription. DO NOT wait until you are out of the medication to request a refill.    V. FOLLOW-UP VISITS:  ? You will need to follow up in the office with your surgeon in  11/13/17. Please call this number [phone]  to schedule this appointment.  If you have any concerns or suspected complications prior to your follow up visit, please call your surgeons office. Do not wait until your appointment time if you suspect complications. These will need to be addressed in the office promptly.    Date: 11/1/2017    Сергей Us MD     Addendum:    I have personally examined this patient. I agree with the above findings and treatment  plan.     Jenna Valdez MD  11/1/2017      CC: PRESTON Valera; MD Jenna Waggoner MD

## 2017-11-01 NOTE — PROGRESS NOTES
Discharge Planning Assessment  [facility]     Patient Name: Shreyas Lao  MRN: [id number]  Today's Date: 11/1/2017    Admit Date: 10/31/2017          Discharge Needs Assessment       11/01/17 0915    Living Environment    Lives With spouse    Living Arrangements house    Home Accessibility no concerns    Number of Stairs to Enter Home 1    Stair Railings at Home outside, present on left side    Type of Financial/Environmental Concern none    Transportation Available car;family or friend will provide    Living Environment    Quality Of Family Relationships involved    Able to Return to Prior Living Arrangements yes    Discharge Needs Assessment    Concerns To Be Addressed denies needs/concerns at this time;no discharge needs identified    Readmission Within The Last 30 Days no previous admission in last 30 days    Discharge Facility/Level Of Care Needs home with home health            Discharge Plan       11/01/17 0916    Case Management/Social Work Plan    Plan Home w/ spouse and [facility].    Patient/Family In Agreement With Plan yes    Additional Comments S/w pt verified facesheet and d/c plan pt plans to d/c home w/ spouse, he [facility]     Final Note    Final Note D/c home w/ [facility], [facility] notified.         Discharge Placement     Facility/Agency Request Status Selected? Address Phone Number Fax Number    [facility] Accepted    [facility] [address] [address] [address] [address] [phone]        Susi Mendoza RN 11/1/2017 09:15    11/1/2017  [facility] notified.                    Expected Discharge Date and Time     Expected Discharge Date Expected Discharge Time    Nov 1, 2017               Demographic Summary     None            Functional Status       11/01/17 0916    Functional Status Current    Ambulation 3-->assistive equipment and person    Transferring 3-->assistive equipment and person    Toileting  3-->assistive equipment and person    Bathing 2-->assistive person    Dressing 2-->assistive person    Eating 0-->independent    Communication 0-->understands/communicates without difficulty    Swallowing (if score 2 or more for any item, consult Rehab Services) 0-->swallows foods/liquids without difficulty    Functional Status Prior    Ambulation 1-->assistive equipment    Transferring 0-->independent    Toileting 0-->independent    Bathing 0-->independent    Dressing 0-->independent    Eating 0-->independent    Communication 0-->understands/communicates without difficulty    Swallowing 0-->swallows foods/liquids without difficulty    IADL    Medications independent    Meal Preparation independent    Housekeeping assistive equipment    Laundry independent    Shopping assistive equipment    Oral Care independent    Activity Tolerance    Usual Activity Tolerance moderate    Current Activity Tolerance fair    Cognitive/Perceptual/Developmental    Current Mental Status/Cognitive Functioning no deficits noted    Recent Changes in Mental Status/Cognitive Functioning no changes            Psychosocial     None            Abuse/Neglect     None            Legal     None            Substance Abuse     None            Patient Forms     None          Susi Mendoza RN

## 2017-11-01 NOTE — CONSULTS
CONSULT NOTE    INTERNAL MEDICINE   [facility]       Patient Identification:  Name: Shreyas MCNAIR Young  Age: 51 y.o.  Sex: male  :  1965  MRN: [id number]             Date of Consultation:  10/31/2017       Primary Care Physician: PRESTON Valera                               Requesting Physician: Dr. Valdez  Reason for Consultation: Medical management of high blood pressure COPD the patient is status post right hip arthroplasty    Chief Complaint:  Came for elective right total hip replacement for severe osteoarthritis of the right hip.    History of Present Illness:   Patient is a 51-year-old male with past medical history remarkable for obesity, COPD, hypertension and gastroesophageal reflux disease was brought in today for elective right total hip arthroplasty for severe osteoarthritis.  Patient was very nervous when he came into the hospital infected has nausea and threw up once.  It finally subsided before surgery.  His postop course was uneventful and he was seen postoperatively feeling otherwise fine and denying any complaints.  He says anesthesia is wearing off and he is having some more discomfort in the right hip.  Patient denies any shortness of breath denies any wheezing denies any confusion or disorientation.      Past Medical History:  Past Medical History:   Diagnosis Date   • Abdominal pain    • Acid reflux    • Benign essential hypertension    • Bradycardia    • Chest pain    • COPD (chronic obstructive pulmonary disease)    • Cough    • Dyslipidemia    • Fatigue    • Hip dysplasia    • Hyperlipidemia    • Hypertension    • Infection of skin    • Infection of tooth    • Obesity    • Osteoarthritis    • Physical exam    • Pleurisy    • Pneumonia    • Preoperative clearance    • Radicular pain of right lower extremity    • Right hip pain    • Risk factors for obstructive sleep apnea    • Sciatic pain    • SOB (shortness of breath)    • Tobacco abuse    • Weight gain,  abnormal      Past Surgical History:  Past Surgical History:   Procedure Laterality Date   • CARDIAC CATHETERIZATION  01/02/2010    Completed by Dr. Osei Stevens which showed no significant stenosis of any of the coronary arteries      Home Meds:  Prescriptions Prior to Admission   Medication Sig Dispense Refill Last Dose   • amLODIPine (NORVASC) 10 MG tablet Take 10 mg by mouth Daily.   10/30/2017 at 0800   • benazepril (LOTENSIN) 40 MG tablet Take 40 mg by mouth Daily.   10/30/2017 at 0800   • cyclobenzaprine (FLEXERIL) 10 MG tablet Take 10 mg by mouth 3 (Three) Times a Day As Needed.   10/30/2017 at 2100   • gabapentin (NEURONTIN) 400 MG capsule Take 400 mg by mouth 3 (Three) Times a Day.   10/30/2017 at 2100   • ibuprofen (ADVIL,MOTRIN) 800 MG tablet Take 800 mg by mouth Every 6 (Six) Hours As Needed.   10/30/2017 at 0800   • metoprolol tartrate (LOPRESSOR) 50 MG tablet Take 50 mg by mouth Daily.   10/30/2017 at Unknown time   • omeprazole (priLOSEC) 40 MG capsule Take 40 mg by mouth Daily.   Past Week at Unknown time   • pravastatin (PRAVACHOL) 40 MG tablet Take 40 mg by mouth Every Night.   10/30/2017 at 2100   • traMADol (ULTRAM) 50 MG tablet Take 1 tablet by mouth Every Night. 40 tablet 0 10/30/2017 at 2100     Current Meds:     Current Facility-Administered Medications:   •  acetaminophen (TYLENOL) tablet 325 mg, 325 mg, Oral, Q4H PRN, Jenna Valdez MD  •  acetaminophen (TYLENOL) tablet 650 mg, 650 mg, Oral, Q4H PRN, Jenna Valdez MD  •  amLODIPine (NORVASC) tablet 10 mg, 10 mg, Oral, Daily, Jenna Valdez MD  •  [START ON 11/1/2017] aspirin EC tablet 325 mg, 325 mg, Oral, Q12H, Jenna Valdez MD  •  bisacodyl (DULCOLAX) EC tablet 10 mg, 10 mg, Oral, Daily PRN, Jenna Valdez MD  •  ceFAZolin in dextrose (ANCEF) IVPB solution 2 g, 2 g, Intravenous, Q8H, Jenna Valdez MD, 2 g at 10/31/17 1557  •  cyclobenzaprine (FLEXERIL) tablet 10 mg, 10 mg,  Oral, TID PRN, Jenna Valdez MD  •  diphenhydrAMINE (BENADRYL) capsule 25 mg, 25 mg, Oral, Q6H PRN, Jenna Valdez MD  •  docusate sodium (COLACE) capsule 100 mg, 100 mg, Oral, BID PRN, Jenna Valdez MD  •  gabapentin (NEURONTIN) capsule 400 mg, 400 mg, Oral, TID, Jenna Valdez MD, 400 mg at 10/31/17 1557  •  HYDROcodone-acetaminophen (NORCO) 7.5-325 MG per tablet 1 tablet, 1 tablet, Oral, Q4H PRN, Jenna Valdez MD, 1 tablet at 10/31/17 1921  •  Influenza Vac Subunit Quad (FLUCELVAX) injection 0.5 mL, 0.5 mL, Intramuscular, During Hospitalization, Jenna Valdez MD  •  lisinopril (PRINIVIL,ZESTRIL) tablet 40 mg, 40 mg, Oral, Daily, Jenna Valdez MD  •  metoprolol tartrate (LOPRESSOR) tablet 50 mg, 50 mg, Oral, Daily, Jenna Valdez MD  •  morphine injection 4 mg, 4 mg, Intravenous, Q2H PRN **AND** naloxone (NARCAN) injection 0.4 mg, 0.4 mg, Intravenous, Q5 Min PRN, Jenna Valdez MD  •  [START ON 11/1/2017] pantoprazole (PROTONIX) EC tablet 40 mg, 40 mg, Oral, QAM, Jenna Valdez MD  •  sodium chloride 0.9 % flush 1-10 mL, 1-10 mL, Intravenous, PRN, Jenna Valdez MD  •  sodium chloride 0.9 % infusion, 100 mL/hr, Intravenous, Continuous, Jenna Valdez MD, Last Rate: 100 mL/hr at 10/31/17 1558, 100 mL/hr at 10/31/17 1558  Allergies:  No Known Allergies  Social History:   Social History   Substance Use Topics   • Smoking status: Heavy Tobacco Smoker     Packs/day: 1.00     Years: 30.00     Types: Cigarettes   • Smokeless tobacco: Never Used      Comment: since age 12   • Alcohol use No      Family History:  Family History   Problem Relation Age of Onset   • Heart disease Mother    • Heart disease Father    • Cancer Father    • Cancer Paternal Grandfather    • Diabetes Other    • Stroke Other    • Malig Hyperthermia Neg Hx           Review of Systems  · See history of present illness and past medical  "history.    · Patient denies headache, dizziness, syncope, falls, trauma, change in vision, change in hearing, change in taste, changes in weight, changes in appetite, focal weakness, numbness, or paresthesia.    · Patient denies chest pain, palpitations, dyspnea, orthopnea, PND, cough, sinus pressure, rhinorrhea, epistaxis, hemoptysis, nausea, vomiting,hematemesis, diarrhea, constipation or hematchezia.    · Denies cold or heat intolerance, polydipsia, polyuria, polyphagia.   · Denies hematuria, pyuria, dysuria, hesitancy, frequency or urgency.   · Denies fever, chills, sweats, night sweats.    · Denies missing any routine medications.   · Remainder of ROS is negative.      Vitals:   /79 (BP Location: Left arm, Patient Position: Lying)  Pulse 62  Temp 97.2 °F (36.2 °C) (Oral)   Resp 16  Ht 63\" (160 cm)  Wt 236 lb 6.4 oz (107 kg)  SpO2 92%  BMI 41.88 kg/m2  I/O:   Intake/Output Summary (Last 24 hours) at 10/31/17 2016  Last data filed at 10/31/17 1827   Gross per 24 hour   Intake             2787 ml   Output              750 ml   Net             2037 ml     Exam:  General Appearance:    Alert, cooperative, no distress, appears stated age   Head:    Normocephalic, without obvious abnormality, atraumatic   Eyes:    PERRL, conjunctiva/corneas clear, EOM's intact, both eyes   Ears:    Normal external ear canals, both ears   Nose:   Nares normal, septum midline, mucosa normal, no drainage    or sinus tenderness   Throat:   Lips, tongue, gums normal; oral mucosa pink and moist   Neck:   Supple, symmetrical, trachea midline, no adenopathy;     thyroid:  no enlargement/tenderness/nodules; no carotid    bruit or JVD   Back:     Symmetric, no curvature, ROM normal, no CVA tenderness   Lungs:     Clear to auscultation bilaterally, respirations unlabored   Chest Wall:    No tenderness or deformity    Heart:    Regular rate and rhythm, S1 and S2 normal, no murmur, rub   or gallop   Abdomen:     Soft, non-tender, " bowel sounds active all four quadrants,     no masses, no hepatomegaly, no splenomegaly   Extremities:   Right hip surgical site dressed    Pulses:   Pulses palpable in all extremities; symmetric all extremities   Skin:   Skin color normal, Skin is warm and dry,  no rashes or palpable lesions   Neurologic:   CNII-XII intact, motor strength grossly intact, sensation grossly intact to light touch, no focal deficits noted       Data Review:      I reviewed the patient's new clinical results.    Results from last 7 days  Lab Units 10/30/17  1208   WBC 10*3/mm3 5.75   HEMOGLOBIN g/dL 15.1   PLATELETS 10*3/mm3 283       Results from last 7 days  Lab Units 10/31/17  1436 10/30/17  1208   SODIUM mmol/L 137 139   POTASSIUM mmol/L 4.6 4.6   CHLORIDE mmol/L 99 100   CO2 mmol/L 25.6 27.0   BUN mg/dL 10 10   CREATININE mg/dL 0.90 0.74*   CALCIUM mg/dL 9.1 9.3   GLUCOSE mg/dL 161* 88       Assessment:  Active Hospital Problems (** Indicates Principal Problem)    Diagnosis Date Noted   • **Arthritis of right hip [M16.11] 10/31/2017   • COPD (chronic obstructive pulmonary disease) [J44.9] 10/31/2017   • HTN (hypertension) [I10] 10/31/2017   • GERD (gastroesophageal reflux disease) [K21.9] 10/31/2017      Resolved Hospital Problems    Diagnosis Date Noted Date Resolved   No resolved problems to display.       Plan:  Continue his medicine for his blood pressure i.e. Amlodipine, lisinopril and metoprolol.  Keep an eye on his renal function.  Management of pain, DVT prophylaxis, activity guidelines per orthopedic surgery service.  Given his body habitus would recommend continuous pulse ox monitoring.  Also provide her with incentive spirometry.  Provided with nebulizer treatment on as-needed basis.  Thank you Dr. Valdez for letting us be the part of your patient care please see above impression and recommendation    Sacha Martins MD   10/31/2017  8:16 PM  Much of this encounter note is an electronic transcription/translation of spoken  language to printed text. The electronic translation of spoken language may permit erroneous, or at times, nonsensical words or phrases to be inadvertently transcribed; Although I have reviewed the note for such errors, some may still exist

## 2017-11-01 NOTE — PLAN OF CARE
Problem: Patient Care Overview (Adult)  Goal: Plan of Care Review  Outcome: Outcome(s) achieved Date Met:  11/01/17 11/01/17 0953   Coping/Psychosocial Response Interventions   Plan Of Care Reviewed With patient;spouse   Outcome Evaluation   Outcome Summary/Follow up Plan Pt presented to OT eval alert and oriented x4. Pt presents with functional deficits however is safe to d/c home with HH and intermittent assist from spouse. Both spouse and pt educated on hip kit AE and was able to demo good understanding and carryover and expressed interest in AE. Pt and spouse provided with info on how to order. No further OT warranted at this time, will sign off.         Problem: Inpatient Occupational Therapy  Goal: Patient Education Goal LTG- OT  Outcome: Outcome(s) achieved Date Met:  11/01/17 11/01/17 0953   Patient Education OT LTG   Patient Education OT LTG, Date Established 11/01/17   Patient Education OT LTG, Time to Achieve 1 wk   Patient Education OT LTG, Education Type adaptive equipment mgmt   Patient Education OT LTG, Additional Goal Pt and spouse educated on hip kit AE and was able to demo good understanding of use.

## 2017-11-01 NOTE — DISCHARGE INSTRUCTIONS
Discharge and Follow up Instructions:     Total Hip Replacement Discharge Instructions:    I. ACTIVITIES:  1. Exercises:  ? Complete exercise program as taught by the hospital physical therapist 2 times per day  ? Exercise program will be advanced by the physical therapist  ? During the day be up ambulating every 2 hours (while awake) for short distances  ? Complete the ankle pump exercises at least 10 times per hour (while awake)  ? Elevate legs when in bed and for at least 30 minutes during the day.Use cold packs 20-30 minutes approximately 5 times per day. This should be done before and after completing your exercises and at any time you are experiencing pain/ stiffness in your operative extremity.      2. Activities of Daily Living:  ? No tub baths, hot tubs, or swimming pools for 4 weeks  ? May shower and let water run over the incision on post-operative day #5 if no drainage. Do not scrub or rub the incision. Simply let the water run over the incision and pat dry.    II. Restrictions  ? Continue posterior  hip precautions as taught at the hospital  ? Your surgeon will discuss with you when you will be able to drive again. Usual guidelines are you are to be off pain medications prior to driving.  ? Weight bearing is as tolerated  ? First week stay inside on even terrain. May go up and down stairs one stair at a time utilizing the hand rail once cleared by physical therapy to do so.  ? After one week, you may venture outside (if cleared to do so by physical therapist).    III. Precautions:  ? Everyone that comes near you should wash their hands  ? No elective dental, genital-urinary, or colon procedures or surgical procedures for 12 weeks after surgery unless absolutely necessary.  ?  If dental work or surgical procedure is deemed absolutely necessary, you will need to contact your surgeon as you will need to take antibiotics 1 hour prior to any dental work (including teeth cleanings).  ? Please discuss with  your surgeon prophylactic antibiotics as the length of time this intervention will be necessary for you varies with each patient’s health history and situation.  ? Avoid sick people. If you must be around someone who is ill, they should wear a mask.  ? Avoid visits to the Emergency Room or Urgent Care. If you feel you need to go to the Emergency Room, please notify your Surgeon's office.  ? Stockings are to be worn for one week after surgery and are to be placed on in the morning and removed at night. Observe your skin when stocking is removed for any problems. Monitor the stockings to ensure that any swelling is not causing the stockings to become too tight. In this case, remove stockings immediately.      IV. INCISION CARE:  ? Wash your hands prior to dressing changes  ? Change the dressing as needed to keep incision clean and dry. Utilize dry gauze and paper tape. Avoid touching the side of the gauze that goes against the incision with your hands.  ? No creams or ointments to the incision  ? May remove dressing once the incision is free of drainage  ? Do not touch or pick at the incision  ? Check incision every day and notify surgeon immediately if any of the following signs or symptoms are noted:  o Increase in redness  o Increase in swelling around the incision and of the entire extremity  o Increase in pain  o Drainage oozing from the incision  o Pulling apart of the edges of the incision  o Increase in overall body temperature (greater than 100.5 degrees)  ? Your Staples will be removed between 10-14 days postoperation.  This may be done by either the home health nurse, rehabilitation nurse or during your return visit to Dr. Valdez's office.  You will then be instructed on how to care for the incision.  (Please call the office if your staples have not been removed within 14 days after surgery).    V. Medications:   1. Anticoagulants: You will be discharged on an anticoagulant. This is a prophylactic  medication that helps prevent blood clots during your post-operative period.  You will be on  Aspirin 325 mg Enteric coated every 12 hours orally  for  21 days.   ? While taking the anticoagulant, you should avoid taking any additional aspirin, ibuprofen (Advil or Motrin), Aleve (Naprosyn) or other non-steroidal anti-inflammatory medications.   ? Notify surgeon immediately if any shahana bleeding is noted in the urine, stool, emesis, or from the nose or the incision. Blood in the stool will often appear as black rather than red. Blood in urine may appear as pink. Blood in emesis may appear as brown/black like coffee grounds.  ? You will need to apply pressure for longer periods of time to any cuts or abrasions to stop bleeding  ? Avoid alcohol while taking anticoagulants    2. Stool Softeners: You will be at greater risk of constipation after surgery due to being less mobile and the pain medications.   ? Take stool softeners as instructed by your surgeon while on pain medications. Over the counter Colace 100 mg 1-2 capsules twice daily.   ? If stools become too loose or too frequent, please decreases the dosage or stop the stool softener.  ? If constipation occurs despite use of stool softeners, you are to continue the stool softeners and add a laxative (Milk of Magnesia 1 ounce daily as needed).  ? Dulcolax oral tabs or suppository, or a fleets enema can also be utilized for constipation and can be obtained over the counter.   ? If above interventions are unsuccessful in inducing bowel movements, please contact your surgeon's office / family physician's office.  ? Drink plenty of fluids, and eat fruits and vegetables during your recovery time    3. Pain Medications utilized after surgery are narcotics and the law requires that the following information be given to all patients that are prescribed narcotics:  ? CLASSIFICATION: Pain medications are called Opioids and are narcotics  ? LEGALITIES: It is illegal to share  narcotics with others and to drive within 24 hours of taking narcotics  ? POTENTIAL SIDE EFFECTS: Potential side effects of opioids include: nausea, vomiting, itching, dizziness, drowsiness, dry mouth, constipation, and difficulty urinating.  ? POTENTIAL ADVERSE EFFECTS:   o Opioid tolerance can develop with use of pain medications and this simply means that it requires more and more of the medication to control pain; however, this is seen more in patients that use opioids for longer periods of time.  o Opioid dependence can develop with use of Opioids and this simply means that to stop the medication can cause withdrawal symptoms; however, this is seen with patients that use Opioids for longer periods of time.  o Opioid addiction can develop with use of Opioids and the incidence of this is very unlikely in patients who take the medications as ordered and stop the medications as instructed.  o Opioid overdose can be dangerous, but is unlikely when the medication is taken as ordered and stopped when ordered. It is important not to mix opioids with alcohol or with and type of sedative such as Benadryl as this can lead to over sedation and respiratory difficulty.  ? DOSAGE:   o Pain medications will need to be taken consistently for the first week to decrease pain and promote adequate pain relief and participation in physical therapy.  o After the initial surgical pain begins to resolve, you may begin to decrease the pain medication. By the end of 6 weeks, you should be off of pain medications.  o Refills will not be given by the office during evening hours, on weekends, or after 6 weeks post-op.  o To seek refills on pain medications during the initial 6 week post-operative period, you must call the office 48 hours in advance to request the refill. The office will then notify you when to  the prescription. DO NOT wait until you are out of the medication to request a refill.    V. FOLLOW-UP VISITS:  ? You will  need to follow up in the office with your surgeon 11/14/17. Please call this number [phone]  to schedule this appointment.  If you have any concerns or suspected complications prior to your follow up visit, please call your surgeons office. Do not wait until your appointment time if you suspect complications. These will need to be addressed in the office promptly.

## 2017-11-01 NOTE — PLAN OF CARE
Problem: Patient Care Overview (Adult)  Goal: Plan of Care Review  Outcome: Ongoing (interventions implemented as appropriate)    11/01/17 1133   Coping/Psychosocial Response Interventions   Plan Of Care Reviewed With patient   Patient Care Overview   Progress improving   Outcome Evaluation   Outcome Summary/Follow up Plan Pain tolerable on PO pain meds, ambulates in room and hallway with walker and 1 assist, educated pt on bp monitoring r/t htn, pt verblized understanding. plan to dc home with hh today         Problem: Perioperative Period (Adult)  Goal: Signs and Symptoms of Listed Potential Problems Will be Absent or Manageable (Perioperative Period)  Outcome: Ongoing (interventions implemented as appropriate)    Problem: Fall Risk (Adult)  Goal: Absence of Falls  Outcome: Ongoing (interventions implemented as appropriate)    Problem: Hip Replacement, Total (Adult)  Goal: Signs and Symptoms of Listed Potential Problems Will be Absent or Manageable (Hip Replacement, Total)  Outcome: Ongoing (interventions implemented as appropriate)

## 2017-11-02 NOTE — THERAPY TREATMENT NOTE
Acute Care - Physical Therapy Treatment Note  [facility]     Patient Name: Shreyas Lao  : 1965  MRN: [id number]  Today's Date: 2017  Onset of Illness/Injury or Date of Surgery Date: 10/31/17 (s/p R RAUL)  Date of Referral to PT: 10/31/17  Referring Physician: Dr. Valdez    Admit Date: 10/31/2017    Visit Dx:    ICD-10-CM ICD-9-CM   1. 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   • COPD (chronic obstructive pulmonary disease)   • HTN (hypertension)   • GERD (gastroesophageal reflux disease)               Adult Rehabilitation Note       17 1150          Rehab Assessment/Intervention    Discipline physical therapy assistant  -      Document Type therapy note (daily note)  -      Subjective Information agree to therapy;complains of;fatigue;pain;weakness  -      Precautions/Limitations fall precautions;hip precautions- right  -      Recorded by [] Lulu Torres PTA      Pain Assessment    Pain Assessment 0-10  -      Pain Score 7  -      Pain Type Surgical pain  -      Pain Location Hip  -      Pain Orientation Right  -      Pain Intervention(s) Medication (See MAR);Repositioned;Cold applied  -      Response to Interventions phill, unchanged  -      Recorded by [JEANE] Lulu Torres PTA      Bed Mobility, Assessment/Treatment    Bed Mob, Supine to Sit, [place] contact guard assist;supervision required;verbal cues required  -      Bed Mob, Sit to Supine, [place] minimum assist (75% patient effort);verbal cues required;nonverbal cues required (demo/gesture)  -      Bed Mobility, Comment fam present for educ  -      Recorded by [JEANE] Lulu Torres PTA      Transfer Assessment/Treatment    Transfers, Sit-Stand [place] supervision required;verbal cues required  -      Transfers, Stand-Sit [place] supervision required;verbal cues  required  -      Transfers, Sit-Stand-Sit, Assist Device rolling walker  -      Transfer, Comment cues for hand placement  -JM      Recorded by [JEANE] Lulu Torres PTA      Gait Assessment/Treatment    Gait, [place] Level supervision required;verbal cues required  -      Gait, Assistive Device rolling walker  -      Gait, Distance (Feet) 165  -      Gait, Gait Deviations step length decreased;stride width increased   fast-paced  -      Recorded by [JEANE] Lulu Torres PTA      Stairs Assessment/Treatment    Number of Stairs 1  -      Stairs, Handrail Location none  -      Stairs, [place] Level contact guard assist;verbal cues required  -      Stairs, Assistive Device walker  -      Stairs, Technique Used step to step (ascending);step to step (descending)  -      Stairs, Comment backward w/supported wx, fam present for educ  -      Recorded by [JEANE] Lulu Torres PTA      Therapy Exercises    Exercise Protocols total hip  -      Total Hip Exercises right:;15 reps;completed protocol   slight assist to perf hip abd  -JM      Recorded by [JEANE] Lulu Torres PTA      Positioning and Restraints    Pre-Treatment Position sitting in chair/recliner  -      In Chair reclined;call light within reach;encouraged to call for assist;with family/caregiver  -JM      Recorded by [JEANE] Lulu Torres PTA        User Key  (r) = Recorded By, (t) = Taken By, (c) = Cosigned By    Initials Name Effective Dates    JEANE Torres PTA 02/18/16 -                 IP PT Goals       10/31/17 1538          Bed Mobility PT LTG    Bed Mobility PT LTG, Date Established 10/31/17  -MA      Bed Mobility PT LTG, Time to Achieve 4 days  -MA      Bed Mobility PT LTG, Activity Type all bed mobility  -MA      Bed Mobility PT LTG, [place] Level supervision required  -MA      Transfer Training PT LTG    Transfer Training PT LTG, Date Established 10/31/17  -MA      Transfer Training PT LTG, Time to Achieve  4 days  -MA      Transfer Training PT LTG, Activity Type all transfers  -MA      Transfer Training PT LTG, [place] Level supervision required  -MA      Transfer Training PT LTG, Assist Device walker, rolling  -MA      Gait Training PT LTG    Gait Training Goal PT LTG, Date Established 10/31/17  -MA      Gait Training Goal PT LTG, Time to Achieve 4 days  -MA      Gait Training Goal PT LTG, [place] Level supervision required  -MA      Gait Training Goal PT LTG, Assist Device walker, rolling  -MA      Gait Training Goal PT LTG, Distance to Achieve 150  -MA      Stair Training PT LTG    Stair Training Goal PT LTG, Date Established 10/31/17  -MA      Stair Training Goal PT LTG, Time to Achieve 4 days  -MA      Stair Training Goal PT LTG, Number of Steps 1  -MA      Stair Training Goal PT LTG, [place] Level contact guard assist  -MA      Stair Training Goal PT LTG, Assist Device --   no HR  -MA        User Key  (r) = Recorded By, (t) = Taken By, (c) = Cosigned By    Initials Name Provider Type    MAGDALENA Estrella PT Physical Therapist          Physical Therapy Education     Title: PT OT SLP Therapies (Resolved)     Topic: Physical Therapy (Resolved)     Point: Mobility training (Resolved)    Learning Progress Summary    Learner Readiness Method Response Comment Documented by Status   Patient Acceptance E,TB,IVAN,H CARINE STAPLES 11/01/17 1158 Done    Acceptance E NR  MA 10/31/17 1517 Active   Family Acceptance E,TB,IVAN,H CARINE STAPLES 11/01/17 1158 Done               Point: Home exercise program (Resolved)    Learning Progress Summary    Learner Readiness Method Response Comment Documented by Status   Patient Acceptance E,TB,D,H CARINE STAPLES 11/01/17 1158 Done    Acceptance E NR  MA 10/31/17 1517 Active   Family Acceptance E,TB,D,H CARINE STAPLES 11/01/17 1158 Done               Point: Body mechanics (Resolved)    Learning Progress Summary    Learner Readiness Method Response Comment Documented by Status   Patient  Acceptance E,TB,D,H CARINE STAPLES   11/01/17 1158 Done    Acceptance E NR  MA 10/31/17 1517 Active   Family Acceptance E,TB,D,H JHOANCARINE   11/01/17 1158 Done               Point: Precautions (Resolved)    Learning Progress Summary    Learner Readiness Method Response Comment Documented by Status   Patient Acceptance E,TB,D,H JHOANCARINE   11/01/17 1158 Done    Acceptance E NR  MA 10/31/17 1517 Active   Family Acceptance E,TB,D,H CARINE STAPLES   11/01/17 1158 Done                      User Key     Initials Effective Dates Name Provider Type Discipline     02/18/16 -  Lulu Torres, PTA Physical Therapy Assistant PT    MA 12/13/16 -  Susanna Estrella, PT Physical Therapist PT                    PT Recommendation and Plan  Anticipated Equipment Needs At Discharge: bedside commode, front wheeled walker (PT ordered 10/31/17)  Anticipated Discharge Disposition: home with assist, home with home health  Planned Therapy Interventions: balance training, bed mobility training, gait training, home exercise program, patient/family education, postural re-education, stair training, strengthening, transfer training  PT Frequency: 2 times/day             Outcome Measures       11/01/17 1200 11/01/17 0900 10/31/17 1500    How much help from another person do you currently need...    Turning from your back to your side while in flat bed without using bedrails? 3  -  4  -MA    Moving from lying on back to sitting on the side of a flat bed without bedrails? 3  -  3  -MA    Moving to and from a bed to a chair (including a wheelchair)? 4  -  3  -MA    Standing up from a chair using your arms (e.g., wheelchair, bedside chair)? 4  -  3  -MA    Climbing 3-5 steps with a railing? 3  -JM  3  -MA    To walk in hospital room? 4  -JM  3  -MA    AM-PAC 6 Clicks Score 21  -  19  -MA    How much help from another is currently needed...    Putting on and taking off regular lower body clothing?  3  -HC     Bathing (including washing, rinsing, and  drying)  3  -HC     Toileting (which includes using toilet bed pan or urinal)  3  -HC     Putting on and taking off regular upper body clothing  4  -HC     Taking care of personal grooming (such as brushing teeth)  4  -HC     Eating meals  4  -HC     Score  21  -HC     Functional Assessment    Outcome Measure Options  AM-PAC 6 Clicks Daily Activity (OT)  -HC AM-PAC 6 Clicks Basic Mobility (PT)  -MA      User Key  (r) = Recorded By, (t) = Taken By, (c) = Cosigned By    Initials Name Provider Type    JEANE Torres PTA Physical Therapy Assistant    MAGDALENA Estrella, PT Physical Therapist    HC Hayde Allan, OTR Occupational Therapist           Time Calculation:       Therapy Charges for Today     Code Description Service Date Service Provider Modifiers Qty    [id number]  PT THER PROC EA 15 MIN 11/1/2017 Lulu Torres PTA GP 2    [id number]  PT THER PROC GROUP 11/1/2017 Lulu Torres PTA GP 1          PT G-Codes  Outcome Measure Options: AM-PAC 6 Clicks Daily Activity (OT)    Lulu Torres PTA  11/2/2017

## 2018-03-07 ENCOUNTER — APPOINTMENT (OUTPATIENT)
Dept: PREADMISSION TESTING | Facility: HOSPITAL | Age: 53
End: 2018-03-07

## 2018-03-19 ENCOUNTER — HOSPITAL ENCOUNTER (OUTPATIENT)
Dept: GENERAL RADIOLOGY | Facility: HOSPITAL | Age: 53
Discharge: HOME | End: 2018-03-19

## 2018-03-19 ENCOUNTER — HOSPITAL ENCOUNTER (OUTPATIENT)
Dept: GENERAL RADIOLOGY | Facility: HOSPITAL | Age: 53
Discharge: HOME | End: 2018-03-19
Admitting: ORTHOPAEDIC SURGERY

## 2018-03-19 ENCOUNTER — APPOINTMENT (OUTPATIENT)
Dept: PREADMISSION TESTING | Facility: HOSPITAL | Age: 53
End: 2018-03-19

## 2018-03-19 VITALS
HEART RATE: 65 BPM | HEIGHT: 63 IN | WEIGHT: 253 LBS | DIASTOLIC BLOOD PRESSURE: 78 MMHG | OXYGEN SATURATION: 94 % | SYSTOLIC BLOOD PRESSURE: 125 MMHG | TEMPERATURE: 98.3 F | RESPIRATION RATE: 20 BRPM | BODY MASS INDEX: 44.83 KG/M2

## 2018-03-19 LAB
ALBUMIN SERPL-MCNC: 4.6 G/DL (ref 3.5–5.2)
ALBUMIN/GLOB SERPL: 1.8 G/DL
ALP SERPL-CCNC: 116 U/L (ref 39–117)
ALT SERPL W P-5'-P-CCNC: 23 U/L (ref 1–41)
ANION GAP SERPL CALCULATED.3IONS-SCNC: 12.1 MMOL/L
APTT PPP: 30.7 SECONDS (ref 22.7–35.4)
AST SERPL-CCNC: 16 U/L (ref 1–40)
BASOPHILS # BLD AUTO: 0.03 10*3/MM3 (ref 0–0.2)
BASOPHILS NFR BLD AUTO: 0.4 % (ref 0–1.5)
BILIRUB SERPL-MCNC: 0.5 MG/DL (ref 0.1–1.2)
BILIRUB UR QL STRIP: NEGATIVE
BUN BLD-MCNC: 14 MG/DL (ref 6–20)
BUN/CREAT SERPL: 12 (ref 7–25)
CALCIUM SPEC-SCNC: 9.2 MG/DL (ref 8.6–10.5)
CHLORIDE SERPL-SCNC: 94 MMOL/L (ref 98–107)
CLARITY UR: CLEAR
CO2 SERPL-SCNC: 26.9 MMOL/L (ref 22–29)
COLOR UR: YELLOW
CREAT BLD-MCNC: 1.17 MG/DL (ref 0.76–1.27)
DEPRECATED RDW RBC AUTO: 42 FL (ref 37–54)
EOSINOPHIL # BLD AUTO: 1.71 10*3/MM3 (ref 0–0.7)
EOSINOPHIL NFR BLD AUTO: 23.8 % (ref 0.3–6.2)
ERYTHROCYTE [DISTWIDTH] IN BLOOD BY AUTOMATED COUNT: 12.9 % (ref 11.5–14.5)
GFR SERPL CREATININE-BSD FRML MDRD: 66 ML/MIN/1.73
GLOBULIN UR ELPH-MCNC: 2.6 GM/DL
GLUCOSE BLD-MCNC: 100 MG/DL (ref 65–99)
GLUCOSE UR STRIP-MCNC: NEGATIVE MG/DL
HCT VFR BLD AUTO: 43.7 % (ref 40.4–52.2)
HGB BLD-MCNC: 14.9 G/DL (ref 13.7–17.6)
HGB UR QL STRIP.AUTO: NEGATIVE
IMM GRANULOCYTES # BLD: 0 10*3/MM3 (ref 0–0.03)
IMM GRANULOCYTES NFR BLD: 0 % (ref 0–0.5)
INR PPP: 0.99 (ref 0.9–1.1)
KETONES UR QL STRIP: NEGATIVE
LEUKOCYTE ESTERASE UR QL STRIP.AUTO: NEGATIVE
LYMPHOCYTES # BLD AUTO: 1.19 10*3/MM3 (ref 0.9–4.8)
LYMPHOCYTES NFR BLD AUTO: 16.6 % (ref 19.6–45.3)
MCH RBC QN AUTO: 30.8 PG (ref 27–32.7)
MCHC RBC AUTO-ENTMCNC: 34.1 G/DL (ref 32.6–36.4)
MCV RBC AUTO: 90.3 FL (ref 79.8–96.2)
MONOCYTES # BLD AUTO: 0.31 10*3/MM3 (ref 0.2–1.2)
MONOCYTES NFR BLD AUTO: 4.3 % (ref 5–12)
NEUTROPHILS # BLD AUTO: 3.93 10*3/MM3 (ref 1.9–8.1)
NEUTROPHILS NFR BLD AUTO: 54.9 % (ref 42.7–76)
NITRITE UR QL STRIP: NEGATIVE
PH UR STRIP.AUTO: 7 [PH] (ref 5–8)
PLATELET # BLD AUTO: 247 10*3/MM3 (ref 140–500)
PMV BLD AUTO: 9.5 FL (ref 6–12)
POTASSIUM BLD-SCNC: 4.7 MMOL/L (ref 3.5–5.2)
PROT SERPL-MCNC: 7.2 G/DL (ref 6–8.5)
PROT UR QL STRIP: NEGATIVE
PROTHROMBIN TIME: 12.9 SECONDS (ref 11.7–14.2)
RBC # BLD AUTO: 4.84 10*6/MM3 (ref 4.6–6)
SODIUM BLD-SCNC: 133 MMOL/L (ref 136–145)
SP GR UR STRIP: 1.01 (ref 1–1.03)
UROBILINOGEN UR QL STRIP: NORMAL
WBC NRBC COR # BLD: 7.17 10*3/MM3 (ref 4.5–10.7)

## 2018-03-19 PROCEDURE — 73502 X-RAY EXAM HIP UNI 2-3 VIEWS: CPT

## 2018-03-19 PROCEDURE — 93010 ELECTROCARDIOGRAM REPORT: CPT | Performed by: INTERNAL MEDICINE

## 2018-03-19 PROCEDURE — 36415 COLL VENOUS BLD VENIPUNCTURE: CPT

## 2018-03-19 PROCEDURE — 85025 COMPLETE CBC W/AUTO DIFF WBC: CPT | Performed by: ORTHOPAEDIC SURGERY

## 2018-03-19 PROCEDURE — 85730 THROMBOPLASTIN TIME PARTIAL: CPT | Performed by: ORTHOPAEDIC SURGERY

## 2018-03-19 PROCEDURE — 71046 X-RAY EXAM CHEST 2 VIEWS: CPT

## 2018-03-19 PROCEDURE — 85610 PROTHROMBIN TIME: CPT | Performed by: ORTHOPAEDIC SURGERY

## 2018-03-19 PROCEDURE — 93005 ELECTROCARDIOGRAM TRACING: CPT

## 2018-03-19 PROCEDURE — 80053 COMPREHEN METABOLIC PANEL: CPT | Performed by: ORTHOPAEDIC SURGERY

## 2018-03-19 PROCEDURE — 81003 URINALYSIS AUTO W/O SCOPE: CPT | Performed by: ORTHOPAEDIC SURGERY

## 2018-03-19 RX ORDER — PRAMIPEXOLE DIHYDROCHLORIDE 0.12 MG/1
0.5 TABLET ORAL NIGHTLY
COMMUNITY

## 2018-03-19 RX ORDER — FUROSEMIDE 20 MG/1
20 TABLET ORAL EVERY MORNING
COMMUNITY
End: 2019-03-10

## 2018-03-19 RX ORDER — IBUPROFEN 400 MG/1
400 TABLET ORAL EVERY 6 HOURS PRN
COMMUNITY
End: 2018-03-21 | Stop reason: HOSPADM

## 2018-03-19 RX ORDER — CHLORHEXIDINE GLUCONATE 0.12 MG/ML
15 RINSE ORAL EVERY 12 HOURS SCHEDULED
Status: CANCELLED | OUTPATIENT
Start: 2018-03-19

## 2018-03-19 ASSESSMENT — HOOS JR
HOOS JR SCORE: 36.363
HOOS JR SCORE: 17

## 2018-03-19 NOTE — DISCHARGE INSTRUCTIONS
Take the following medications the morning of surgery with a small sip of water:  Metoprolol, omeprazole        General Instructions:  • Do not eat solid food after midnight the night before surgery.  • You may drink clear liquids day of surgery but must stop at least one hour before your hospital arrival time.  • It is beneficial for you to have a clear drink that contains carbohydrates the day of surgery.  We suggest a 12 to 20 ounce bottle of Gatorade or Powerade for non-diabetic patients or a 12 to 20 ounce bottle of G2 or Powerade Zero for diabetic patients. (Pediatric patients, are not advised to drink a 12 to 20 ounce carbohydrate drink)    Clear liquids are liquids you can see through.  Nothing red in color.     Plain water                               Sports drinks  Sodas                                   Gelatin (Jell-O)  Fruit juices without pulp such as white grape juice and apple juice  Popsicles that contain no fruit or yogurt  Tea or coffee (no cream or milk added)  Gatorade / Powerade  G2 / Powerade Zero    • Infants may have breast milk up to four hours before surgery.  • Infants drinking formula may drink formula up to six hours before surgery.   • Patients who avoid smoking, chewing tobacco and alcohol for 4 weeks prior to surgery have a reduced risk of post-operative complications.  Quit smoking as many days before surgery as you can.  • Do not smoke, use chewing tobacco or drink alcohol the day of surgery.   • If applicable bring your C-PAP/ BI-PAP machine.  • Bring any papers given to you in the doctor’s office.  • Wear clean comfortable clothes and socks.  • Do not wear contact lenses or make-up.  Bring a case for your glasses.   • Bring crutches or walker if applicable.  • Remove all piercings.  Leave jewelry and any other valuables at home.  • Hair extensions with metal clips must be removed prior to surgery.  • The Pre-Admission Testing nurse will instruct you to bring medications if  unable to obtain an accurate list in Pre-Admission Testing.        If you were given a blood bank ID arm band remember to bring it with you the day of surgery.    Preventing a Surgical Site Infection:  • For 2 to 3 days before surgery, avoid shaving with a razor because the razor can irritate skin and make it easier to develop an infection.  • The night prior to surgery sleep in a clean bed with clean clothing.  Do not allow pets to sleep with you.  • Shower on the morning of surgery using a fresh bar of anti-bacterial soap (such as Dial) and clean washcloth.  Dry with a clean towel and dress in clean clothing.  • Ask your surgeon if you will be receiving antibiotics prior to surgery.  • Make sure you, your family, and all healthcare providers clean their hands with soap and water or an alcohol based hand  before caring for you or your wound.    Day of surgery:  Upon arrival, a Pre-op nurse and Anesthesiologist will review your health history, obtain vital signs, and answer questions you may have.  The only belongings needed at this time will be your home medications and if applicable your C-PAP/BI-PAP machine.  If you are staying overnight your family can leave the rest of your belongings in the car and bring them to your room later.  A Pre-op nurse will start an IV and you may receive medication in preparation for surgery, including something to help you relax.  Your family will be able to see you in the Pre-op area.  While you are in surgery your family should notify the waiting room  if they leave the waiting room area and provide a contact phone number.    Please be aware that surgery does come with discomfort.  We want to make every effort to control your discomfort so please discuss any uncontrolled symptoms with your nurse.   Your doctor will most likely have prescribed pain medications.      If you are going home after surgery you will receive individualized written care instructions  before being discharged.  A responsible adult must drive you to and from the hospital on the day of your surgery and stay with you for 24 hours.    If you are staying overnight following surgery, you will be transported to your hospital room following the recovery period.  [facility] has all private rooms.    If you have any questions please call Pre-Admission Testing at [phone].  Deductibles and co-payments are collected on the day of service. Please be prepared to pay the required co-pay, deductible or deposit on the day of service as defined by your plan.    2% CHLORAHEXIDINE GLUCONATE* CLOTH  Preparing or “prepping” skin before surgery can reduce the risk of infection at the surgical site. To make the process easier, [facility] has chosen disposable cloths moistened with a rinse-free, 2% Chlorhexidine Gluconate (CHG) antiseptic solution. The steps below outline the prepping process and should be carefully followed.        Use the prep cloth on the area that is circled in the diagram             Directions Night before Surgery  1) Shower using a fresh bar of anti-bacterial soap (such as Dial) and clean washcloth.  Use a clean towel to completely dry your skin.  2) Do not use any lotions, oils or creams on your skin.  3) Open the package and remove 1 cloth, wipe your skin for 30 seconds in a circular motion.  Allow to dry for 3 minutes.  4) Repeat #3 with second cloth.  5) Do not touch your eyes, ears, or mouth with the prep cloth.  6) Allow the wet prep solution to air dry.  7) Discard the prep cloth and wash your hands with soap and water.   8) Dress in clean bed clothes and sleep on fresh clean bed sheets.   9) You may experience some temporary itching after the prep.    Directions Day of Surgery  1) Repeat steps 1,2,3,4,5,6,7, and 9.   2) Dress in clean clothes before coming to the hospital.    BACTROBAN NASAL OINTMENT  There are many germs normally in your nose. Bactroban is an  ointment that will help reduce these germs. Please follow these instructions for Bactroban use:    ____Two days before surgery in the evening Date________    ____The day before surgery in the morning  Date________    ____The day before surgery in the evening              Date________    ____The day of surgery in the morning    Date________    **Squirt ½ package of Bactroban Ointment onto a cotton applicator and apply to inside of 1st nostril.  Squirt the remaining Bactroban and apply to the inside of the other nostril.    PERIDEX- ORAL:  Use only if your surgeon has ordered  Use the night before and morning of surgery - Swish, gargle, and spit - do not swallow.

## 2018-03-20 ENCOUNTER — ANESTHESIA EVENT (OUTPATIENT)
Dept: PERIOP | Facility: HOSPITAL | Age: 53
End: 2018-03-20

## 2018-03-20 ENCOUNTER — APPOINTMENT (OUTPATIENT)
Dept: GENERAL RADIOLOGY | Facility: HOSPITAL | Age: 53
End: 2018-03-20

## 2018-03-20 ENCOUNTER — HOSPITAL ENCOUNTER (INPATIENT)
Facility: HOSPITAL | Age: 53
LOS: 1 days | Discharge: HOME HEALTH | End: 2018-03-21
Attending: ORTHOPAEDIC SURGERY | Admitting: ORTHOPAEDIC SURGERY

## 2018-03-20 ENCOUNTER — ANESTHESIA (OUTPATIENT)
Dept: PERIOP | Facility: HOSPITAL | Age: 53
End: 2018-03-20

## 2018-03-20 DIAGNOSIS — R26.2 DIFFICULTY WALKING: Primary | ICD-10-CM

## 2018-03-20 PROBLEM — M16.32: Chronic | Status: ACTIVE | Noted: 2018-03-20

## 2018-03-20 PROBLEM — M16.10 OSTEOARTHRITIS OF ONE HIP: Status: ACTIVE | Noted: 2018-03-20

## 2018-03-20 PROCEDURE — 25010000002 KETOROLAC TROMETHAMINE PER 15 MG: Performed by: ORTHOPAEDIC SURGERY

## 2018-03-20 PROCEDURE — 73501 X-RAY EXAM HIP UNI 1 VIEW: CPT

## 2018-03-20 PROCEDURE — 25010000002 MIDAZOLAM PER 1 MG: Performed by: ANESTHESIOLOGY

## 2018-03-20 PROCEDURE — 25010000002 CLONIDINE PER 1 MG: Performed by: ORTHOPAEDIC SURGERY

## 2018-03-20 PROCEDURE — G8979 MOBILITY GOAL STATUS: HCPCS | Performed by: PHYSICAL THERAPIST

## 2018-03-20 PROCEDURE — 0SRB03A REPLACEMENT OF LEFT HIP JOINT WITH CERAMIC SYNTHETIC SUBSTITUTE, UNCEMENTED, OPEN APPROACH: ICD-10-PCS | Performed by: ORTHOPAEDIC SURGERY

## 2018-03-20 PROCEDURE — C1776 JOINT DEVICE (IMPLANTABLE): HCPCS | Performed by: ORTHOPAEDIC SURGERY

## 2018-03-20 PROCEDURE — 25010000003 CEFAZOLIN IN DEXTROSE 2-4 GM/100ML-% SOLUTION: Performed by: ORTHOPAEDIC SURGERY

## 2018-03-20 PROCEDURE — 25010000002 DEXAMETHASONE PER 1 MG: Performed by: NURSE ANESTHETIST, CERTIFIED REGISTERED

## 2018-03-20 PROCEDURE — 94640 AIRWAY INHALATION TREATMENT: CPT

## 2018-03-20 PROCEDURE — 25010000002 FENTANYL CITRATE (PF) 100 MCG/2ML SOLUTION: Performed by: ANESTHESIOLOGY

## 2018-03-20 PROCEDURE — 25010000002 HYDROMORPHONE PER 4 MG: Performed by: NURSE ANESTHETIST, CERTIFIED REGISTERED

## 2018-03-20 PROCEDURE — 25010000002 FENTANYL CITRATE (PF) 100 MCG/2ML SOLUTION: Performed by: NURSE ANESTHETIST, CERTIFIED REGISTERED

## 2018-03-20 PROCEDURE — 97110 THERAPEUTIC EXERCISES: CPT | Performed by: PHYSICAL THERAPIST

## 2018-03-20 PROCEDURE — C1713 ANCHOR/SCREW BN/BN,TIS/BN: HCPCS | Performed by: ORTHOPAEDIC SURGERY

## 2018-03-20 PROCEDURE — 97162 PT EVAL MOD COMPLEX 30 MIN: CPT | Performed by: PHYSICAL THERAPIST

## 2018-03-20 PROCEDURE — 25010000002 PROPOFOL 10 MG/ML EMULSION: Performed by: NURSE ANESTHETIST, CERTIFIED REGISTERED

## 2018-03-20 PROCEDURE — G8978 MOBILITY CURRENT STATUS: HCPCS | Performed by: PHYSICAL THERAPIST

## 2018-03-20 PROCEDURE — 25010000002 ROPIVACAINE PER 1 MG: Performed by: ORTHOPAEDIC SURGERY

## 2018-03-20 DEVICE — CERAMAX CERAMIC INSERT NEUTRAL 52MM OD 36MM ID
Type: IMPLANTABLE DEVICE | Status: FUNCTIONAL
Brand: CERAMAX

## 2018-03-20 DEVICE — PINNACLE GRIPTION ACETABULAR SHELL SECTOR 52MM OD
Type: IMPLANTABLE DEVICE | Status: FUNCTIONAL
Brand: PINNACLE GRIPTION

## 2018-03-20 DEVICE — TOTL HIP CERAMAX DEPUY 9924854: Type: IMPLANTABLE DEVICE | Status: FUNCTIONAL

## 2018-03-20 DEVICE — TOTL HIP STEM DEPUY UPCHRG: Type: IMPLANTABLE DEVICE | Status: FUNCTIONAL

## 2018-03-20 DEVICE — ACTIS DUOFIX HIP PROSTHESIS (FEMORAL STEM 12/14 TAPER CEMENTLESS SIZE 6 STD COLLAR)  CE
Type: IMPLANTABLE DEVICE | Status: FUNCTIONAL
Brand: ACTIS

## 2018-03-20 DEVICE — TOTL HIP GRIPTION CUP DEPUY UPCHRG: Type: IMPLANTABLE DEVICE | Status: FUNCTIONAL

## 2018-03-20 DEVICE — BIOLOX DELTA CERAMIC FEMORAL HEAD +5.0 36MM DIA 12/14 TAPER
Type: IMPLANTABLE DEVICE | Status: FUNCTIONAL
Brand: BIOLOX DELTA

## 2018-03-20 RX ORDER — PREGABALIN 75 MG/1
75 CAPSULE ORAL ONCE
Status: COMPLETED | OUTPATIENT
Start: 2018-03-20 | End: 2018-03-20

## 2018-03-20 RX ORDER — SODIUM CHLORIDE, SODIUM LACTATE, POTASSIUM CHLORIDE, CALCIUM CHLORIDE 600; 310; 30; 20 MG/100ML; MG/100ML; MG/100ML; MG/100ML
INJECTION, SOLUTION INTRAVENOUS CONTINUOUS PRN
Status: DISCONTINUED | OUTPATIENT
Start: 2018-03-20 | End: 2018-03-20 | Stop reason: SURG

## 2018-03-20 RX ORDER — LISINOPRIL 10 MG/1
10 TABLET ORAL
Status: DISCONTINUED | OUTPATIENT
Start: 2018-03-20 | End: 2018-03-21

## 2018-03-20 RX ORDER — FAMOTIDINE 10 MG/ML
20 INJECTION, SOLUTION INTRAVENOUS ONCE
Status: COMPLETED | OUTPATIENT
Start: 2018-03-20 | End: 2018-03-20

## 2018-03-20 RX ORDER — NALOXONE HCL 0.4 MG/ML
0.2 VIAL (ML) INJECTION AS NEEDED
Status: DISCONTINUED | OUTPATIENT
Start: 2018-03-20 | End: 2018-03-20 | Stop reason: HOSPADM

## 2018-03-20 RX ORDER — SODIUM CHLORIDE, SODIUM LACTATE, POTASSIUM CHLORIDE, CALCIUM CHLORIDE 600; 310; 30; 20 MG/100ML; MG/100ML; MG/100ML; MG/100ML
9 INJECTION, SOLUTION INTRAVENOUS CONTINUOUS
Status: DISCONTINUED | OUTPATIENT
Start: 2018-03-20 | End: 2018-03-20 | Stop reason: HOSPADM

## 2018-03-20 RX ORDER — IPRATROPIUM BROMIDE AND ALBUTEROL SULFATE 2.5; .5 MG/3ML; MG/3ML
3 SOLUTION RESPIRATORY (INHALATION) EVERY 4 HOURS PRN
Status: DISCONTINUED | OUTPATIENT
Start: 2018-03-20 | End: 2018-03-21 | Stop reason: HOSPADM

## 2018-03-20 RX ORDER — HYDROMORPHONE HYDROCHLORIDE 1 MG/ML
0.5 INJECTION, SOLUTION INTRAMUSCULAR; INTRAVENOUS; SUBCUTANEOUS
Status: DISCONTINUED | OUTPATIENT
Start: 2018-03-20 | End: 2018-03-21 | Stop reason: HOSPADM

## 2018-03-20 RX ORDER — UREA 10 %
1 LOTION (ML) TOPICAL NIGHTLY PRN
Status: DISCONTINUED | OUTPATIENT
Start: 2018-03-20 | End: 2018-03-21 | Stop reason: HOSPADM

## 2018-03-20 RX ORDER — MIDAZOLAM HYDROCHLORIDE 1 MG/ML
2 INJECTION INTRAMUSCULAR; INTRAVENOUS
Status: DISCONTINUED | OUTPATIENT
Start: 2018-03-20 | End: 2018-03-20 | Stop reason: HOSPADM

## 2018-03-20 RX ORDER — HYDRALAZINE HYDROCHLORIDE 20 MG/ML
5 INJECTION INTRAMUSCULAR; INTRAVENOUS
Status: DISCONTINUED | OUTPATIENT
Start: 2018-03-20 | End: 2018-03-20 | Stop reason: HOSPADM

## 2018-03-20 RX ORDER — METOPROLOL TARTRATE 50 MG/1
50 TABLET, FILM COATED ORAL EVERY 12 HOURS SCHEDULED
Status: DISCONTINUED | OUTPATIENT
Start: 2018-03-20 | End: 2018-03-20

## 2018-03-20 RX ORDER — PRAMIPEXOLE DIHYDROCHLORIDE 0.25 MG/1
0.12 TABLET ORAL NIGHTLY
Status: DISCONTINUED | OUTPATIENT
Start: 2018-03-20 | End: 2018-03-20

## 2018-03-20 RX ORDER — PROPOFOL 10 MG/ML
VIAL (ML) INTRAVENOUS AS NEEDED
Status: DISCONTINUED | OUTPATIENT
Start: 2018-03-20 | End: 2018-03-20 | Stop reason: SURG

## 2018-03-20 RX ORDER — FENTANYL CITRATE 50 UG/ML
50 INJECTION, SOLUTION INTRAMUSCULAR; INTRAVENOUS
Status: DISCONTINUED | OUTPATIENT
Start: 2018-03-20 | End: 2018-03-20 | Stop reason: HOSPADM

## 2018-03-20 RX ORDER — HYDROCODONE BITARTRATE AND ACETAMINOPHEN 7.5; 325 MG/1; MG/1
1 TABLET ORAL EVERY 4 HOURS PRN
Status: DISCONTINUED | OUTPATIENT
Start: 2018-03-20 | End: 2018-03-21 | Stop reason: HOSPADM

## 2018-03-20 RX ORDER — ASPIRIN 325 MG
325 TABLET, DELAYED RELEASE (ENTERIC COATED) ORAL EVERY 12 HOURS SCHEDULED
Status: DISCONTINUED | OUTPATIENT
Start: 2018-03-21 | End: 2018-03-21 | Stop reason: HOSPADM

## 2018-03-20 RX ORDER — SODIUM CHLORIDE 0.9 % (FLUSH) 0.9 %
1-10 SYRINGE (ML) INJECTION AS NEEDED
Status: DISCONTINUED | OUTPATIENT
Start: 2018-03-20 | End: 2018-03-21 | Stop reason: HOSPADM

## 2018-03-20 RX ORDER — ACETAMINOPHEN 500 MG
1000 TABLET ORAL ONCE
Status: COMPLETED | OUTPATIENT
Start: 2018-03-20 | End: 2018-03-20

## 2018-03-20 RX ORDER — LIDOCAINE HYDROCHLORIDE 20 MG/ML
INJECTION, SOLUTION INFILTRATION; PERINEURAL AS NEEDED
Status: DISCONTINUED | OUTPATIENT
Start: 2018-03-20 | End: 2018-03-20 | Stop reason: SURG

## 2018-03-20 RX ORDER — LABETALOL HYDROCHLORIDE 5 MG/ML
5 INJECTION, SOLUTION INTRAVENOUS
Status: DISCONTINUED | OUTPATIENT
Start: 2018-03-20 | End: 2018-03-20 | Stop reason: HOSPADM

## 2018-03-20 RX ORDER — BISACODYL 10 MG
10 SUPPOSITORY, RECTAL RECTAL DAILY PRN
Status: DISCONTINUED | OUTPATIENT
Start: 2018-03-20 | End: 2018-03-21 | Stop reason: HOSPADM

## 2018-03-20 RX ORDER — AMLODIPINE BESYLATE 10 MG/1
10 TABLET ORAL NIGHTLY
Status: DISCONTINUED | OUTPATIENT
Start: 2018-03-20 | End: 2018-03-21 | Stop reason: HOSPADM

## 2018-03-20 RX ORDER — METOPROLOL TARTRATE 50 MG/1
50 TABLET, FILM COATED ORAL EVERY 12 HOURS SCHEDULED
Status: DISCONTINUED | OUTPATIENT
Start: 2018-03-20 | End: 2018-03-21 | Stop reason: HOSPADM

## 2018-03-20 RX ORDER — OMEPRAZOLE 40 MG/1
40 CAPSULE, DELAYED RELEASE ORAL
Status: DISCONTINUED | OUTPATIENT
Start: 2018-03-21 | End: 2018-03-21 | Stop reason: HOSPADM

## 2018-03-20 RX ORDER — DIPHENHYDRAMINE HYDROCHLORIDE 50 MG/ML
12.5 INJECTION INTRAMUSCULAR; INTRAVENOUS
Status: DISCONTINUED | OUTPATIENT
Start: 2018-03-20 | End: 2018-03-20 | Stop reason: HOSPADM

## 2018-03-20 RX ORDER — ROCURONIUM BROMIDE 10 MG/ML
INJECTION, SOLUTION INTRAVENOUS AS NEEDED
Status: DISCONTINUED | OUTPATIENT
Start: 2018-03-20 | End: 2018-03-20 | Stop reason: SURG

## 2018-03-20 RX ORDER — PANTOPRAZOLE SODIUM 40 MG/1
40 TABLET, DELAYED RELEASE ORAL EVERY MORNING
Status: DISCONTINUED | OUTPATIENT
Start: 2018-03-21 | End: 2018-03-21 | Stop reason: HOSPADM

## 2018-03-20 RX ORDER — CYCLOBENZAPRINE HCL 10 MG
10 TABLET ORAL 3 TIMES DAILY PRN
Status: DISCONTINUED | OUTPATIENT
Start: 2018-03-20 | End: 2018-03-21 | Stop reason: HOSPADM

## 2018-03-20 RX ORDER — OXYCODONE AND ACETAMINOPHEN 7.5; 325 MG/1; MG/1
1 TABLET ORAL ONCE AS NEEDED
Status: DISCONTINUED | OUTPATIENT
Start: 2018-03-20 | End: 2018-03-20 | Stop reason: HOSPADM

## 2018-03-20 RX ORDER — POTASSIUM CHLORIDE 750 MG/1
10 CAPSULE, EXTENDED RELEASE ORAL 2 TIMES DAILY
Status: DISCONTINUED | OUTPATIENT
Start: 2018-03-20 | End: 2018-03-20

## 2018-03-20 RX ORDER — ONDANSETRON 2 MG/ML
4 INJECTION INTRAMUSCULAR; INTRAVENOUS ONCE AS NEEDED
Status: DISCONTINUED | OUTPATIENT
Start: 2018-03-20 | End: 2018-03-20 | Stop reason: HOSPADM

## 2018-03-20 RX ORDER — TRANEXAMIC ACID 100 MG/ML
INJECTION, SOLUTION INTRAVENOUS AS NEEDED
Status: DISCONTINUED | OUTPATIENT
Start: 2018-03-20 | End: 2018-03-20 | Stop reason: SURG

## 2018-03-20 RX ORDER — DIPHENHYDRAMINE HYDROCHLORIDE 50 MG/ML
25 INJECTION INTRAMUSCULAR; INTRAVENOUS EVERY 6 HOURS PRN
Status: DISCONTINUED | OUTPATIENT
Start: 2018-03-20 | End: 2018-03-21 | Stop reason: HOSPADM

## 2018-03-20 RX ORDER — HYDROMORPHONE HCL 110MG/55ML
0.5 PATIENT CONTROLLED ANALGESIA SYRINGE INTRAVENOUS
Status: DISCONTINUED | OUTPATIENT
Start: 2018-03-20 | End: 2018-03-20 | Stop reason: HOSPADM

## 2018-03-20 RX ORDER — OXYCODONE HCL 10 MG/1
10 TABLET, FILM COATED, EXTENDED RELEASE ORAL ONCE
Status: COMPLETED | OUTPATIENT
Start: 2018-03-20 | End: 2018-03-20

## 2018-03-20 RX ORDER — PROMETHAZINE HYDROCHLORIDE 25 MG/1
12.5 TABLET ORAL ONCE AS NEEDED
Status: DISCONTINUED | OUTPATIENT
Start: 2018-03-20 | End: 2018-03-20 | Stop reason: HOSPADM

## 2018-03-20 RX ORDER — PROMETHAZINE HYDROCHLORIDE 25 MG/1
25 SUPPOSITORY RECTAL ONCE AS NEEDED
Status: DISCONTINUED | OUTPATIENT
Start: 2018-03-20 | End: 2018-03-20 | Stop reason: HOSPADM

## 2018-03-20 RX ORDER — BISACODYL 5 MG/1
10 TABLET, DELAYED RELEASE ORAL DAILY PRN
Status: DISCONTINUED | OUTPATIENT
Start: 2018-03-20 | End: 2018-03-21 | Stop reason: HOSPADM

## 2018-03-20 RX ORDER — PRAMIPEXOLE DIHYDROCHLORIDE 0.25 MG/1
0.12 TABLET ORAL NIGHTLY
Status: DISCONTINUED | OUTPATIENT
Start: 2018-03-20 | End: 2018-03-21 | Stop reason: HOSPADM

## 2018-03-20 RX ORDER — SODIUM CHLORIDE 9 MG/ML
100 INJECTION, SOLUTION INTRAVENOUS CONTINUOUS
Status: ACTIVE | OUTPATIENT
Start: 2018-03-20 | End: 2018-03-21

## 2018-03-20 RX ORDER — HYDROCODONE BITARTRATE AND ACETAMINOPHEN 7.5; 325 MG/1; MG/1
2 TABLET ORAL EVERY 4 HOURS PRN
Status: DISCONTINUED | OUTPATIENT
Start: 2018-03-20 | End: 2018-03-21 | Stop reason: HOSPADM

## 2018-03-20 RX ORDER — AMLODIPINE BESYLATE 10 MG/1
10 TABLET ORAL NIGHTLY
Status: DISCONTINUED | OUTPATIENT
Start: 2018-03-20 | End: 2018-03-20

## 2018-03-20 RX ORDER — FLUMAZENIL 0.1 MG/ML
0.2 INJECTION INTRAVENOUS AS NEEDED
Status: DISCONTINUED | OUTPATIENT
Start: 2018-03-20 | End: 2018-03-20 | Stop reason: HOSPADM

## 2018-03-20 RX ORDER — DOCUSATE SODIUM 100 MG/1
100 CAPSULE, LIQUID FILLED ORAL 2 TIMES DAILY PRN
Status: DISCONTINUED | OUTPATIENT
Start: 2018-03-20 | End: 2018-03-21 | Stop reason: HOSPADM

## 2018-03-20 RX ORDER — PROMETHAZINE HYDROCHLORIDE 25 MG/1
25 TABLET ORAL ONCE AS NEEDED
Status: DISCONTINUED | OUTPATIENT
Start: 2018-03-20 | End: 2018-03-20 | Stop reason: HOSPADM

## 2018-03-20 RX ORDER — HYDROCODONE BITARTRATE AND ACETAMINOPHEN 7.5; 325 MG/1; MG/1
1 TABLET ORAL ONCE AS NEEDED
Status: DISCONTINUED | OUTPATIENT
Start: 2018-03-20 | End: 2018-03-20 | Stop reason: HOSPADM

## 2018-03-20 RX ORDER — HYDROCODONE BITARTRATE AND ACETAMINOPHEN 7.5; 325 MG/1; MG/1
1 TABLET ORAL EVERY 4 HOURS PRN
Qty: 60 TABLET | Refills: 0 | Status: SHIPPED | OUTPATIENT
Start: 2018-03-20 | End: 2018-03-30

## 2018-03-20 RX ORDER — BENAZEPRIL HYDROCHLORIDE 40 MG/1
40 TABLET, FILM COATED ORAL EVERY MORNING
Status: DISCONTINUED | OUTPATIENT
Start: 2018-03-21 | End: 2018-03-21 | Stop reason: HOSPADM

## 2018-03-20 RX ORDER — IPRATROPIUM BROMIDE AND ALBUTEROL SULFATE 2.5; .5 MG/3ML; MG/3ML
3 SOLUTION RESPIRATORY (INHALATION) ONCE
Status: COMPLETED | OUTPATIENT
Start: 2018-03-20 | End: 2018-03-20

## 2018-03-20 RX ORDER — CEFAZOLIN SODIUM 2 G/100ML
2 INJECTION, SOLUTION INTRAVENOUS ONCE
Status: COMPLETED | OUTPATIENT
Start: 2018-03-20 | End: 2018-03-20

## 2018-03-20 RX ORDER — PANTOPRAZOLE SODIUM 40 MG/1
40 TABLET, DELAYED RELEASE ORAL EVERY MORNING
Status: DISCONTINUED | OUTPATIENT
Start: 2018-03-21 | End: 2018-03-20

## 2018-03-20 RX ORDER — EPHEDRINE SULFATE 50 MG/ML
5 INJECTION, SOLUTION INTRAVENOUS ONCE AS NEEDED
Status: DISCONTINUED | OUTPATIENT
Start: 2018-03-20 | End: 2018-03-20 | Stop reason: HOSPADM

## 2018-03-20 RX ORDER — LIDOCAINE HYDROCHLORIDE 40 MG/ML
SOLUTION TOPICAL AS NEEDED
Status: DISCONTINUED | OUTPATIENT
Start: 2018-03-20 | End: 2018-03-20 | Stop reason: SURG

## 2018-03-20 RX ORDER — HYDROMORPHONE HCL 110MG/55ML
PATIENT CONTROLLED ANALGESIA SYRINGE INTRAVENOUS AS NEEDED
Status: DISCONTINUED | OUTPATIENT
Start: 2018-03-20 | End: 2018-03-20 | Stop reason: SURG

## 2018-03-20 RX ORDER — LIDOCAINE HYDROCHLORIDE 10 MG/ML
0.5 INJECTION, SOLUTION EPIDURAL; INFILTRATION; INTRACAUDAL; PERINEURAL ONCE AS NEEDED
Status: DISCONTINUED | OUTPATIENT
Start: 2018-03-20 | End: 2018-03-20 | Stop reason: HOSPADM

## 2018-03-20 RX ORDER — ONDANSETRON 2 MG/ML
4 INJECTION INTRAMUSCULAR; INTRAVENOUS EVERY 6 HOURS PRN
Status: DISCONTINUED | OUTPATIENT
Start: 2018-03-20 | End: 2018-03-21 | Stop reason: HOSPADM

## 2018-03-20 RX ORDER — DEXAMETHASONE SODIUM PHOSPHATE 10 MG/ML
INJECTION INTRAMUSCULAR; INTRAVENOUS AS NEEDED
Status: DISCONTINUED | OUTPATIENT
Start: 2018-03-20 | End: 2018-03-20 | Stop reason: SURG

## 2018-03-20 RX ORDER — ONDANSETRON 4 MG/1
4 TABLET, FILM COATED ORAL EVERY 6 HOURS PRN
Status: DISCONTINUED | OUTPATIENT
Start: 2018-03-20 | End: 2018-03-21 | Stop reason: HOSPADM

## 2018-03-20 RX ORDER — SODIUM CHLORIDE 0.9 % (FLUSH) 0.9 %
1-10 SYRINGE (ML) INJECTION AS NEEDED
Status: DISCONTINUED | OUTPATIENT
Start: 2018-03-20 | End: 2018-03-20 | Stop reason: HOSPADM

## 2018-03-20 RX ORDER — ACETAMINOPHEN 325 MG/1
325 TABLET ORAL EVERY 4 HOURS PRN
Status: DISCONTINUED | OUTPATIENT
Start: 2018-03-20 | End: 2018-03-21 | Stop reason: HOSPADM

## 2018-03-20 RX ORDER — EPHEDRINE SULFATE 50 MG/ML
INJECTION, SOLUTION INTRAVENOUS AS NEEDED
Status: DISCONTINUED | OUTPATIENT
Start: 2018-03-20 | End: 2018-03-20 | Stop reason: SURG

## 2018-03-20 RX ORDER — NALOXONE HCL 0.4 MG/ML
0.1 VIAL (ML) INJECTION
Status: DISCONTINUED | OUTPATIENT
Start: 2018-03-20 | End: 2018-03-21 | Stop reason: HOSPADM

## 2018-03-20 RX ORDER — ALBUTEROL SULFATE 2.5 MG/3ML
2.5 SOLUTION RESPIRATORY (INHALATION) ONCE AS NEEDED
Status: DISCONTINUED | OUTPATIENT
Start: 2018-03-20 | End: 2018-03-20 | Stop reason: HOSPADM

## 2018-03-20 RX ORDER — ONDANSETRON 4 MG/1
4 TABLET, ORALLY DISINTEGRATING ORAL EVERY 6 HOURS PRN
Status: DISCONTINUED | OUTPATIENT
Start: 2018-03-20 | End: 2018-03-21 | Stop reason: HOSPADM

## 2018-03-20 RX ORDER — PROMETHAZINE HYDROCHLORIDE 25 MG/ML
12.5 INJECTION, SOLUTION INTRAMUSCULAR; INTRAVENOUS ONCE AS NEEDED
Status: DISCONTINUED | OUTPATIENT
Start: 2018-03-20 | End: 2018-03-20 | Stop reason: HOSPADM

## 2018-03-20 RX ORDER — MIDAZOLAM HYDROCHLORIDE 1 MG/ML
1 INJECTION INTRAMUSCULAR; INTRAVENOUS
Status: DISCONTINUED | OUTPATIENT
Start: 2018-03-20 | End: 2018-03-20 | Stop reason: HOSPADM

## 2018-03-20 RX ORDER — MEPERIDINE HYDROCHLORIDE 25 MG/ML
12.5 INJECTION INTRAMUSCULAR; INTRAVENOUS; SUBCUTANEOUS
Status: DISCONTINUED | OUTPATIENT
Start: 2018-03-20 | End: 2018-03-20 | Stop reason: HOSPADM

## 2018-03-20 RX ORDER — LISINOPRIL 10 MG/1
10 TABLET ORAL
Status: DISCONTINUED | OUTPATIENT
Start: 2018-03-20 | End: 2018-03-20

## 2018-03-20 RX ORDER — CEFAZOLIN SODIUM 2 G/100ML
2 INJECTION, SOLUTION INTRAVENOUS EVERY 8 HOURS
Status: COMPLETED | OUTPATIENT
Start: 2018-03-20 | End: 2018-03-21

## 2018-03-20 RX ORDER — DIPHENHYDRAMINE HCL 25 MG
25 CAPSULE ORAL EVERY 6 HOURS PRN
Status: DISCONTINUED | OUTPATIENT
Start: 2018-03-20 | End: 2018-03-21 | Stop reason: HOSPADM

## 2018-03-20 RX ADMIN — Medication 2 MG: at 11:46

## 2018-03-20 RX ADMIN — AMLODIPINE BESYLATE 10 MG: 10 TABLET ORAL at 21:03

## 2018-03-20 RX ADMIN — HYDROMORPHONE HYDROCHLORIDE 0.5 MG: 2 INJECTION INTRAMUSCULAR; INTRAVENOUS; SUBCUTANEOUS at 11:38

## 2018-03-20 RX ADMIN — OXYCODONE HYDROCHLORIDE 10 MG: 10 TABLET, FILM COATED, EXTENDED RELEASE ORAL at 08:23

## 2018-03-20 RX ADMIN — IPRATROPIUM BROMIDE AND ALBUTEROL SULFATE 3 ML: .5; 3 SOLUTION RESPIRATORY (INHALATION) at 09:28

## 2018-03-20 RX ADMIN — CYCLOBENZAPRINE 10 MG: 10 TABLET, FILM COATED ORAL at 23:33

## 2018-03-20 RX ADMIN — CEFAZOLIN SODIUM 2 G: 2 INJECTION, SOLUTION INTRAVENOUS at 10:18

## 2018-03-20 RX ADMIN — ROCURONIUM BROMIDE 50 MG: 10 INJECTION INTRAVENOUS at 10:21

## 2018-03-20 RX ADMIN — METOPROLOL TARTRATE 50 MG: 50 TABLET, FILM COATED ORAL at 21:03

## 2018-03-20 RX ADMIN — MIDAZOLAM 1 MG: 1 INJECTION INTRAMUSCULAR; INTRAVENOUS at 09:34

## 2018-03-20 RX ADMIN — LIDOCAINE HYDROCHLORIDE 1 EACH: 40 SPRAY LARYNGEAL; TRANSTRACHEAL at 10:25

## 2018-03-20 RX ADMIN — PRAMIPEXOLE DIHYDROCHLORIDE 0.12 MG: 0.25 TABLET ORAL at 21:03

## 2018-03-20 RX ADMIN — FENTANYL CITRATE 50 MCG: 50 INJECTION INTRAMUSCULAR; INTRAVENOUS at 12:53

## 2018-03-20 RX ADMIN — HYDROMORPHONE HYDROCHLORIDE 0.5 MG: 2 INJECTION INTRAMUSCULAR; INTRAVENOUS; SUBCUTANEOUS at 13:19

## 2018-03-20 RX ADMIN — EPHEDRINE SULFATE 10 MG: 50 INJECTION INTRAMUSCULAR; INTRAVENOUS; SUBCUTANEOUS at 11:55

## 2018-03-20 RX ADMIN — CEFAZOLIN SODIUM 2 G: 2 INJECTION, SOLUTION INTRAVENOUS at 17:06

## 2018-03-20 RX ADMIN — HYDROCODONE BITARTRATE AND ACETAMINOPHEN 2 TABLET: 7.5; 325 TABLET ORAL at 14:57

## 2018-03-20 RX ADMIN — FENTANYL CITRATE 50 MCG: 50 INJECTION INTRAMUSCULAR; INTRAVENOUS at 10:21

## 2018-03-20 RX ADMIN — Medication 2 MG: at 10:18

## 2018-03-20 RX ADMIN — TRANEXAMIC ACID 1000 MG: 100 INJECTION, SOLUTION INTRAVENOUS at 10:55

## 2018-03-20 RX ADMIN — PREGABALIN 75 MG: 75 CAPSULE ORAL at 08:23

## 2018-03-20 RX ADMIN — FENTANYL CITRATE 50 MCG: 50 INJECTION INTRAMUSCULAR; INTRAVENOUS at 11:07

## 2018-03-20 RX ADMIN — HYDROMORPHONE HYDROCHLORIDE 0.5 MG: 2 INJECTION INTRAMUSCULAR; INTRAVENOUS; SUBCUTANEOUS at 10:59

## 2018-03-20 RX ADMIN — HYDROMORPHONE HYDROCHLORIDE 0.5 MG: 2 INJECTION INTRAMUSCULAR; INTRAVENOUS; SUBCUTANEOUS at 13:25

## 2018-03-20 RX ADMIN — SODIUM CHLORIDE, POTASSIUM CHLORIDE, SODIUM LACTATE AND CALCIUM CHLORIDE: 600; 310; 30; 20 INJECTION, SOLUTION INTRAVENOUS at 09:13

## 2018-03-20 RX ADMIN — ROCURONIUM BROMIDE 15 MG: 10 INJECTION INTRAVENOUS at 11:42

## 2018-03-20 RX ADMIN — ROCURONIUM BROMIDE 20 MG: 10 INJECTION INTRAVENOUS at 11:07

## 2018-03-20 RX ADMIN — HYDROMORPHONE HYDROCHLORIDE 0.5 MG: 2 INJECTION INTRAMUSCULAR; INTRAVENOUS; SUBCUTANEOUS at 13:36

## 2018-03-20 RX ADMIN — FAMOTIDINE 20 MG: 10 INJECTION INTRAVENOUS at 09:24

## 2018-03-20 RX ADMIN — DEXAMETHASONE SODIUM PHOSPHATE 8 MG: 10 INJECTION INTRAMUSCULAR; INTRAVENOUS at 10:43

## 2018-03-20 RX ADMIN — HYDROCODONE BITARTRATE AND ACETAMINOPHEN 2 TABLET: 7.5; 325 TABLET ORAL at 19:34

## 2018-03-20 RX ADMIN — MIDAZOLAM 1 MG: 1 INJECTION INTRAMUSCULAR; INTRAVENOUS at 09:43

## 2018-03-20 RX ADMIN — SUGAMMADEX 200 MG: 100 INJECTION, SOLUTION INTRAVENOUS at 12:01

## 2018-03-20 RX ADMIN — ACETAMINOPHEN 1000 MG: 500 TABLET ORAL at 08:23

## 2018-03-20 RX ADMIN — FENTANYL CITRATE 50 MCG: 50 INJECTION INTRAMUSCULAR; INTRAVENOUS at 10:49

## 2018-03-20 RX ADMIN — FENTANYL CITRATE 50 MCG: 50 INJECTION INTRAMUSCULAR; INTRAVENOUS at 13:10

## 2018-03-20 RX ADMIN — SODIUM CHLORIDE, POTASSIUM CHLORIDE, SODIUM LACTATE AND CALCIUM CHLORIDE: 600; 310; 30; 20 INJECTION, SOLUTION INTRAVENOUS at 11:35

## 2018-03-20 RX ADMIN — LIDOCAINE HYDROCHLORIDE 100 MG: 20 INJECTION, SOLUTION INFILTRATION; PERINEURAL at 10:21

## 2018-03-20 RX ADMIN — SODIUM CHLORIDE, POTASSIUM CHLORIDE, SODIUM LACTATE AND CALCIUM CHLORIDE 9 ML/HR: 600; 310; 30; 20 INJECTION, SOLUTION INTRAVENOUS at 09:24

## 2018-03-20 RX ADMIN — PROPOFOL 200 MG: 10 INJECTION, EMULSION INTRAVENOUS at 10:21

## 2018-03-20 RX ADMIN — HYDROCODONE BITARTRATE AND ACETAMINOPHEN 2 TABLET: 7.5; 325 TABLET ORAL at 23:33

## 2018-03-20 NOTE — ANESTHESIA POSTPROCEDURE EVALUATION
Patient: Shreyas Lao    Procedure Summary     Date:  03/20/18 Room / Location:  [facility] OR  / [facility] MAIN OR    Anesthesia Start:  1016 Anesthesia Stop:  1226    Procedure:  LEFT TOTAL HIP ARTHROPLASTY (Left Hip) Diagnosis:       Unilateral osteoarthritis resulting from hip dysplasia, left hip      (Unilateral osteoarthritis resulting from hip dysplasia, left hip)    Surgeon:  Jenna Valdez MD Provider:  Malik Fournier MD    Anesthesia Type:  general ASA Status:  3          Anesthesia Type: general  Last vitals  BP   115/57 (03/20/18 1305)   Temp   36.5 °C (97.7 °F) (03/20/18 1222)   Pulse   65 (03/20/18 1305)   Resp   16 (03/20/18 1305)     SpO2   98 % (03/20/18 1305)     Post Anesthesia Care and Evaluation    Patient location during evaluation: bedside  Patient participation: complete - patient participated  Level of consciousness: sleepy but conscious  Pain score: 0  Pain management: adequate  Airway patency: patent  Anesthetic complications: No anesthetic complications    Cardiovascular status: acceptable  Respiratory status: acceptable  Hydration status: acceptable    Comments: /57   Pulse 65   Temp 36.5 °C (97.7 °F) (Oral)   Resp 16   SpO2 98%

## 2018-03-20 NOTE — ANESTHESIA POSTPROCEDURE EVALUATION
Patient: Shreyas Lao    Procedure Summary     Date:  03/20/18 Room / Location:  [facility] OR  / [facility] MAIN OR    Anesthesia Start:  1016 Anesthesia Stop:  1226    Procedure:  LEFT TOTAL HIP ARTHROPLASTY (Left Hip) Diagnosis:       Unilateral osteoarthritis resulting from hip dysplasia, left hip      (Unilateral osteoarthritis resulting from hip dysplasia, left hip)    Surgeon:  Jenna Valdez MD Provider:  Malik Fournier MD    Anesthesia Type:  general ASA Status:  3          Anesthesia Type: general  Last vitals  BP   115/57 (03/20/18 1305)   Temp   36.5 °C (97.7 °F) (03/20/18 1222)   Pulse   65 (03/20/18 1305)   Resp   16 (03/20/18 1305)     SpO2   98 % (03/20/18 1305)     Post Anesthesia Care and Evaluation    Patient location during evaluation: bedside  Patient participation: complete - patient participated  Level of consciousness: awake  Pain score: 2  Pain management: adequate  Airway patency: patent  Anesthetic complications: No anesthetic complications    Cardiovascular status: acceptable  Respiratory status: acceptable  Hydration status: acceptable    Comments: /57   Pulse 65   Temp 36.5 °C (97.7 °F) (Oral)   Resp 16   SpO2 98%

## 2018-03-20 NOTE — ANESTHESIA PREPROCEDURE EVALUATION
Anesthesia Evaluation     Patient summary reviewed and Nursing notes reviewed   NPO Solid Status: > 8 hours  NPO Liquid Status: > 2 hours           Airway   Mallampati: II  no difficulty expected  Dental - normal exam   (+) poor dentition    Comment: carrious upper teeth      Pulmonary     breath sounds clear to auscultation  (+) pneumonia , a smoker Current Smoked day of surgery, COPD, shortness of breath, wheezes,   Cardiovascular     ECG reviewed  Rhythm: regular  Rate: normal    (+) hypertension, hyperlipidemia,       Neuro/Psych  (+) numbness,     GI/Hepatic/Renal/Endo    (+) morbid obesity, GERD,      Musculoskeletal     Abdominal    Substance History      OB/GYN          Other   (+) arthritis                     Anesthesia Plan    ASA 3     general     intravenous induction   Anesthetic plan and risks discussed with patient.       General Information and Staff    Patient location during procedure: OR  Anesthesiologist: SRUTHI CORDERO  CRNA: OSMAR FLORES     Indications and Patient Condition  Indications for airway management: airway protection    Preoxygenated: yes  MILS maintained throughout  Mask difficulty assessment: 1 - vent by mask     Final Airway Details  Final airway type: endotracheal airway        Successful airway: ETT  Cuffed: yes   Successful intubation technique: direct laryngoscopy  Facilitating devices/methods: cricoid pressure  Endotracheal tube insertion site: oral  Blade: Cr  Blade size: #3  ETT size: 8.0 mm  Cormack-Lehane Classification: grade I - full view of glottis  Placement verified by: chest auscultation and capnometry   Inital cuff pressure (cm H2O): 22  Cuff volume (mL): 8  Measured from: lips  ETT to lips (cm): 22  Number of attempts at approach: 1

## 2018-03-20 NOTE — CONSULTS
Name: Shreyas Lao ADMIT: 3/20/2018   : 1965  PCP: PRESTON Valera    MRN: [id number] LOS: 0 days   AGE/SEX: 52 y.o. male  ROOM: [facility]/     No chief complaint on file.  Consult re:HTN,COPD    Subjective   Mr. Lao is a 52 y.o. male with a history of tobacco use and HTN who was admitted to orthopedic surgery and left RAUL. He reports feeling some pain during procedure and has some pain in leg currently. He just received pain medication and reports improving some. No CP SOA NVD. Had mild HA after anesthesia which is improving. Was told he had wheezing by anesthesia and received breathing treatment. He does not use inhalers at home and reports no COPD diagnosis. He is a current smoker and does not want nicotine patch or lozenge at this time.    He reports stability on HTN medications. Asking about lasix, and we discussed holding lasix while he is receiving IVF and until he is tolerating PO.    History of Present Illness    Past Medical History:   Diagnosis Date   • Abdominal pain    • Acid reflux    • Benign essential hypertension    • Bradycardia    • Chest pain    • COPD (chronic obstructive pulmonary disease)    • Cough    • Dyslipidemia    • Fatigue    • Hip dysplasia    • Hyperlipidemia    • Hypertension    • Infection of skin    • Infection of tooth    • Obesity    • Osteoarthritis    • Physical exam    • Pleurisy    • Pneumonia    • Preoperative clearance    • Radicular pain of right lower extremity    • Right hip pain    • Risk factors for obstructive sleep apnea    • Sciatic pain    • SOB (shortness of breath)    • Tobacco abuse    • Weight gain, abnormal      Past Surgical History:   Procedure Laterality Date   • CARDIAC CATHETERIZATION  2010    Completed by Dr. Osei Stevens which showed no significant stenosis of any of the coronary arteries   • TOTAL HIP ARTHROPLASTY Right 10/31/2017    Procedure: RIGHT TOTAL HIP ARTHROPLASTY POSTERIOR;  Surgeon: Jenna Valdez MD;   Location: [facility] MAIN OR;  Service:      Family History   Problem Relation Age of Onset   • Heart disease Mother    • Heart disease Father    • Cancer Father    • Cancer Paternal Grandfather    • Diabetes Other    • Stroke Other    • Malig Hyperthermia Neg Hx      Social History   Substance Use Topics   • Smoking status: Heavy Tobacco Smoker     Packs/day: 1.00     Years: 30.00     Types: Cigarettes   • Smokeless tobacco: Never Used      Comment: since age 12   • Alcohol use No     Prescriptions Prior to Admission   Medication Sig Dispense Refill Last Dose   • amLODIPine (NORVASC) 10 MG tablet Take 10 mg by mouth Every Evening.   3/20/2018 at 0630   • benazepril (LOTENSIN) 40 MG tablet Take 40 mg by mouth Every Morning.   3/20/2018 at 0630   • cyclobenzaprine (FLEXERIL) 10 MG tablet Take 10 mg by mouth 2 (Two) Times a Day As Needed.   3/19/2018 at 2100   • furosemide (LASIX) 20 MG tablet Take 20 mg by mouth Every Morning.   3/19/2018 at 0830   • ibuprofen (ADVIL,MOTRIN) 400 MG tablet Take 400 mg by mouth Every 6 (Six) Hours As Needed for Mild Pain .   3/6/2018   • metoprolol tartrate (LOPRESSOR) 50 MG tablet Take 50 mg by mouth Every 12 (Twelve) Hours.   3/20/2018 at 0630   • omeprazole (priLOSEC) 40 MG capsule Take 40 mg by mouth As Needed.   Past Week at Unknown time   • POTASSIUM CHLORIDE ER PO Take 1 tablet by mouth 2 (Two) Times a Day.   3/19/2018 at 0830   • pramipexole (MIRAPEX) 0.125 MG tablet Take 0.125 mg by mouth Every Night. 2 tabs each dose   3/19/2018 at 2100   • pravastatin (PRAVACHOL) 40 MG tablet Take 40 mg by mouth Every Night.   3/19/2018 at 2100     Allergies:  No Known Allergies    Review of Systems   Constitutional: Negative for chills and fever.   HENT: Negative for sore throat and trouble swallowing.    Eyes: Negative for pain and visual disturbance.   Respiratory: Negative for cough and shortness of breath.    Cardiovascular: Negative for chest pain and palpitations.   Gastrointestinal:  Negative for constipation, diarrhea, nausea and vomiting.   Endocrine: Negative for cold intolerance and heat intolerance.   Genitourinary: Negative for difficulty urinating and dysuria.   Musculoskeletal: Negative for neck pain and neck stiffness.   Skin: Negative for pallor and rash.   Allergic/Immunologic: Negative for environmental allergies and food allergies.   Neurological: Negative for seizures and syncope.   Hematological: Negative for adenopathy.   Psychiatric/Behavioral: Negative for agitation and confusion.        Objective    Vital Signs  Temp:  [97.7 °F (36.5 °C)-97.9 °F (36.6 °C)] 97.7 °F (36.5 °C)  Heart Rate:  [55-78] 75  Resp:  [12-20] 16  BP: ()/(57-82) 112/62  SpO2:  [92 %-100 %] 96 %  on  Flow (L/min):  [2-4] 2;   Device (Oxygen Therapy): nasal cannula  There is no height or weight on file to calculate BMI.    Physical Exam   Constitutional: He is oriented to person, place, and time. He appears well-developed. No distress.   HENT:   Head: Normocephalic and atraumatic.   Eyes: EOM are normal. Pupils are equal, round, and reactive to light.   Neck: Normal range of motion. Neck supple.   Cardiovascular: Normal rate, regular rhythm and intact distal pulses.    Pulmonary/Chest: Effort normal. He has no wheezes. He has no rales.   Prolonged expiration   Abdominal: Soft. He exhibits no distension. There is no tenderness.   Musculoskeletal: He exhibits tenderness. He exhibits no edema.   Neurological: He is alert and oriented to person, place, and time.   Skin: Skin is warm and dry. He is not diaphoretic.   Psychiatric: He has a normal mood and affect. His behavior is normal.   Nursing note and vitals reviewed.      Results Review:   I reviewed the patient's new clinical results.    Lab Results (last 24 hours)     ** No results found for the last 24 hours. **          XR Hip With or Without Pelvis 1 View Left   Final Result        Assessment/Plan      Active Hospital Problems (** Indicates  Principal Problem)    Diagnosis Date Noted   • **Unilateral osteoarthritis resulting from hip dysplasia, left hip [M16.32] 03/20/2018      Resolved Hospital Problems    Diagnosis Date Noted Date Resolved   No resolved problems to display.     - HTN: Will hold lasix and potassium supplements while he is on IVF and until taking PO. Continue amlodipine, metoprolol, ace inhibitor. Will follow up BMP in morning and monitor BP.  - Tobacco Use: COPD certainly possible. No acute exacerbation on exam. Duonebs PRN. Wean O2 as able.  - Hyponatremia: Mild. Monitor.  - GERD: PPI  - RLS: Pramipexole  - OA L Hip sp RAUL: Per primary service.    Thank you for the consult and allowing me to participate in Mr Lao's care. Will continue to follow. Please call with any questions or concerns.    I discussed the patients findings and my recommendations with patient.    Raulito Hurtado MD  [facility]  03/20/18  3:25 PM

## 2018-03-20 NOTE — DISCHARGE INSTRUCTIONS
Discharge and Follow up Instructions:     Total Hip Replacement Discharge Instructions:    I. ACTIVITIES:  1. Exercises:  ? Complete exercise program as taught by the hospital physical therapist 2 times per day  ? Exercise program will be advanced by the physical therapist  ? During the day be up ambulating every 2 hours (while awake) for short distances  ? Complete the ankle pump exercises at least 10 times per hour (while awake)  ? Elevate legs when in bed and for at least 30 minutes during the day.Use cold packs 20-30 minutes approximately 5 times per day. This should be done before and after completing your exercises and at any time you are experiencing pain/ stiffness in your operative extremity.      2. Activities of Daily Living:  ? No tub baths, hot tubs, or swimming pools for 4 weeks  ? May shower and let water run over the incision on post-operative day #5 if no drainage. Do not scrub or rub the incision. Simply let the water run over the incision and pat dry.    II. Restrictions  ? Continue posterior  hip precautions as taught at the hospital  ? Your surgeon will discuss with you when you will be able to drive again. Usual guidelines are you are to be off pain medications prior to driving.  ? Weight bearing is as tolerated  ? First week stay inside on even terrain. May go up and down stairs one stair at a time utilizing the hand rail once cleared by physical therapy to do so.  ? After one week, you may venture outside (if cleared to do so by physical therapist).    III. Precautions:  ? Everyone that comes near you should wash their hands  ? No elective dental, genital-urinary, or colon procedures or surgical procedures for 12 weeks after surgery unless absolutely necessary.  ?  If dental work or surgical procedure is deemed absolutely necessary, you will need to contact your surgeon as you will need to take antibiotics 1 hour prior to any dental work (including teeth cleanings).  ? Please discuss with  your surgeon prophylactic antibiotics as the length of time this intervention will be necessary for you varies with each patient’s health history and situation.  ? Avoid sick people. If you must be around someone who is ill, they should wear a mask.  ? Avoid visits to the Emergency Room or Urgent Care. If you feel you need to go to the Emergency Room, please notify your Surgeon's office.  ? Stockings are to be worn for one week after surgery and are to be placed on in the morning and removed at night. Observe your skin when stocking is removed for any problems. Monitor the stockings to ensure that any swelling is not causing the stockings to become too tight. In this case, remove stockings immediately.      IV. INCISION CARE:  ? Wash your hands prior to dressing changes  ? Change the dressing as needed to keep incision clean and dry. Utilize dry gauze and paper tape. Avoid touching the side of the gauze that goes against the incision with your hands.  ? No creams or ointments to the incision  ? May remove dressing once the incision is free of drainage  ? Do not touch or pick at the incision  ? Check incision every day and notify surgeon immediately if any of the following signs or symptoms are noted:  o Increase in redness  o Increase in swelling around the incision and of the entire extremity  o Increase in pain  o Drainage oozing from the incision  o Pulling apart of the edges of the incision  o Increase in overall body temperature (greater than 100.5 degrees)        Staples will be removed between 10-14 days postoperation.  This may be done by either the home health nurse, rehabilitation nurse or during your return visit to Dr. Valdez's office.  You will then be instructed on how to care for the incision.  (Please call the office if your staples have not been removed within 14 days after surgery).    V. Medications:   1. Anticoagulants: You will be discharged on an anticoagulant. This is a prophylactic  medication that helps prevent blood clots during your post-operative period.  You will be on  Aspirin 325 mg Enteric coated every 12 hours orally  for  21 days.   ? While taking the anticoagulant, you should avoid taking any additional aspirin, ibuprofen (Advil or Motrin), Aleve (Naprosyn) or other non-steroidal anti-inflammatory medications.   ? Notify surgeon immediately if any shahana bleeding is noted in the urine, stool, emesis, or from the nose or the incision. Blood in the stool will often appear as black rather than red. Blood in urine may appear as pink. Blood in emesis may appear as brown/black like coffee grounds.  ? You will need to apply pressure for longer periods of time to any cuts or abrasions to stop bleeding  ? Avoid alcohol while taking anticoagulants    2. Stool Softeners: You will be at greater risk of constipation after surgery due to being less mobile and the pain medications.   ? Take stool softeners as instructed by your surgeon while on pain medications. Over the counter Colace 100 mg 1-2 capsules twice daily.   ? If stools become too loose or too frequent, please decreases the dosage or stop the stool softener.  ? If constipation occurs despite use of stool softeners, you are to continue the stool softeners and add a laxative (Milk of Magnesia 1 ounce daily as needed).  ? Dulcolax oral tabs or suppository, or a fleets enema can also be utilized for constipation and can be obtained over the counter.   ? If above interventions are unsuccessful in inducing bowel movements, please contact your surgeon's office / family physician's office.  ? Drink plenty of fluids, and eat fruits and vegetables during your recovery time    3. Pain Medications utilized after surgery are narcotics and the law requires that the following information be given to all patients that are prescribed narcotics:  ? CLASSIFICATION: Pain medications are called Opioids and are narcotics  ? LEGALITIES: It is illegal to share  narcotics with others and to drive within 24 hours of taking narcotics  ? POTENTIAL SIDE EFFECTS: Potential side effects of opioids include: nausea, vomiting, itching, dizziness, drowsiness, dry mouth, constipation, and difficulty urinating.  ? POTENTIAL ADVERSE EFFECTS:   o Opioid tolerance can develop with use of pain medications and this simply means that it requires more and more of the medication to control pain; however, this is seen more in patients that use opioids for longer periods of time.  o Opioid dependence can develop with use of Opioids and this simply means that to stop the medication can cause withdrawal symptoms; however, this is seen with patients that use Opioids for longer periods of time.  o Opioid addiction can develop with use of Opioids and the incidence of this is very unlikely in patients who take the medications as ordered and stop the medications as instructed.  o Opioid overdose can be dangerous, but is unlikely when the medication is taken as ordered and stopped when ordered. It is important not to mix opioids with alcohol or with and type of sedative such as Benadryl as this can lead to over sedation and respiratory difficulty.  ? DOSAGE:   o Pain medications will need to be taken consistently for the first week to decrease pain and promote adequate pain relief and participation in physical therapy.  o After the initial surgical pain begins to resolve, you may begin to decrease the pain medication. By the end of 6 weeks, you should be off of pain medications.  o Refills will not be given by the office during evening hours, on weekends, or after 6 weeks post-op.  o To seek refills on pain medications during the initial 6 week post-operative period, you must call the office 48 hours in advance to request the refill. The office will then notify you when to  the prescription. DO NOT wait until you are out of the medication to request a refill.    V. FOLLOW-UP VISITS:  ? You will  need to follow up in the office with your surgeon in  4/4/2018. Please call this number [phone]  to schedule this appointment.  If you have any concerns or suspected complications prior to your follow up visit, please call your surgeons office. Do not wait until your appointment time if you suspect complications. These will need to be addressed in the office promptly.

## 2018-03-20 NOTE — DISCHARGE SUMMARY
Orthopedic Discharge Summary      Patient: Shreyas Lao    YOB: 1965    Medical Record Number: [id number]    Attending Physician: Jenna Valdez, *  Consulting Physician(s):   Date of Admission: 3/20/2018  7:25 AM  Date of Discharge:     Admitting Diagnosis: Osteoarthritis of one hip [M16.10]    Procedures Performed  Procedure(s):  LEFT TOTAL HIP ARTHROPLASTY       LT TOTAL HIP ARTHROPLASTY    Patient Active Problem List   Diagnosis   • Osteoarthritis resulting from right hip dysplasia   • COPD (chronic obstructive pulmonary disease)   • HTN (hypertension)   • GERD (gastroesophageal reflux disease)   • Unilateral osteoarthritis resulting from hip dysplasia, left hip        No Known Allergies     Shreyas Lao   Home Medication Instructions BRITTANY:[id number]    Printed on:03/20/18 1949   Medication Information                      amLODIPine (NORVASC) 10 MG tablet  Take 10 mg by mouth Every Evening.             benazepril (LOTENSIN) 40 MG tablet  Take 40 mg by mouth Every Morning.             cyclobenzaprine (FLEXERIL) 10 MG tablet  Take 10 mg by mouth 2 (Two) Times a Day As Needed.             furosemide (LASIX) 20 MG tablet  Take 20 mg by mouth Every Morning.             ibuprofen (ADVIL,MOTRIN) 400 MG tablet  Take 400 mg by mouth Every 6 (Six) Hours As Needed for Mild Pain .             metoprolol tartrate (LOPRESSOR) 50 MG tablet  Take 50 mg by mouth Every 12 (Twelve) Hours.             omeprazole (priLOSEC) 40 MG capsule  Take 40 mg by mouth As Needed.             POTASSIUM CHLORIDE ER PO  Take 1 tablet by mouth 2 (Two) Times a Day.             pramipexole (MIRAPEX) 0.125 MG tablet  Take 0.125 mg by mouth Every Night. 2 tabs each dose             pravastatin (PRAVACHOL) 40 MG tablet  Take 40 mg by mouth Every Night.                      Past Medical History:   Diagnosis Date   • Abdominal pain    • Acid reflux    • Benign essential hypertension    • Bradycardia    • Chest pain    • COPD  (chronic obstructive pulmonary disease)    • Cough    • Dyslipidemia    • Fatigue    • Hip dysplasia    • Hyperlipidemia    • Hypertension    • Infection of skin    • Infection of tooth    • Obesity    • Osteoarthritis    • Physical exam    • Pleurisy    • Pneumonia    • Preoperative clearance    • Radicular pain of right lower extremity    • Right hip pain    • Risk factors for obstructive sleep apnea    • Sciatic pain    • SOB (shortness of breath)    • Tobacco abuse    • Weight gain, abnormal         Past Surgical History:   Procedure Laterality Date   • CARDIAC CATHETERIZATION  01/02/2010    Completed by Dr. Osei Stevens which showed no significant stenosis of any of the coronary arteries   • TOTAL HIP ARTHROPLASTY Right 10/31/2017    Procedure: RIGHT TOTAL HIP ARTHROPLASTY POSTERIOR;  Surgeon: Jenna Valdez MD;  Location: [facility] OR;  Service:         Social History     Occupational History   • Not on file.     Social History Main Topics   • Smoking status: Heavy Tobacco Smoker     Packs/day: 1.00     Years: 30.00     Types: Cigarettes   • Smokeless tobacco: Never Used      Comment: since age 12   • Alcohol use No   • Drug use: No      Comment: OCCAS   • Sexual activity: Defer      Social History     Social History Narrative   • No narrative on file        Family History   Problem Relation Age of Onset   • Heart disease Mother    • Heart disease Father    • Cancer Father    • Cancer Paternal Grandfather    • Diabetes Other    • Stroke Other    • Malig Hyperthermia Neg Hx        Physical Exam: 52 y.o. male  General Appearance:    Alert, cooperative, in no acute distress                      Vitals:    03/20/18 1500 03/20/18 1600 03/20/18 1700 03/20/18 1900   BP: 112/62 104/70  141/77   BP Location: Left arm Left arm  Left arm   Patient Position: Lying Lying  Lying   Pulse: 75 75 70 74   Resp: 16 16 16 16   Temp:       TempSrc:       SpO2: 96% 95% 96% 95%   Weight:  115 kg (253 lb)     Height:   "160 cm (63\")          Hospital Course:  52 y.o. male admitted to [facility] to services of Jenna Valdez, * with Osteoarthritis of one hip [M16.10] on 3/20/2018 and underwent a posterior total hip arthroplasty Per Jenna Valdez MD. Antibiotic  Kefzol  every 8 hours and VTE prophylaxis  Aspirin 325 mg every 12 hours orally  were per SCIP protocols. Post-operatively the patient transferred to the post-operative floor where the patient underwent mobilization therapy that included active ROM exercises. Opioids were titrated to achieve appropriate pain management to allow for participation in mobilization exercises. Vital signs are now stable. The incision is intact without signs or symptoms of infection. Operative extremity neurovascular status remains intact.   Appropriate education re: incision care, activity levels, medications, hip dislocation precautions, and follow up visits was completed and all questions were answered. The patient is now deemed stable for discharge.    DIAGNOSTIC TESTS:     Appointment on 03/19/2018   Component Date Value Ref Range Status   • PTT 03/19/2018 30.7  22.7 - 35.4 seconds Final   • Protime 03/19/2018 12.9  11.7 - 14.2 Seconds Final   • INR 03/19/2018 0.99  0.90 - 1.10 Final   • Color, UA 03/19/2018 Yellow  Yellow, Straw Final   • Appearance, UA 03/19/2018 Clear  Clear Final   • pH, UA 03/19/2018 7.0  5.0 - 8.0 Final   • Specific Gravity, UA 03/19/2018 1.011  1.005 - 1.030 Final   • Glucose, UA 03/19/2018 Negative  Negative Final   • Ketones, UA 03/19/2018 Negative  Negative Final   • Bilirubin, UA 03/19/2018 Negative  Negative Final   • Blood, UA 03/19/2018 Negative  Negative Final   • Protein, UA 03/19/2018 Negative  Negative Final   • Leuk Esterase, UA 03/19/2018 Negative  Negative Final   • Nitrite, UA 03/19/2018 Negative  Negative Final   • Urobilinogen, UA 03/19/2018 1.0 E.U./dL  0.2 - 1.0 E.U./dL Final   • Glucose 03/19/2018 100* 65 - 99 mg/dL Final   • " BUN 03/19/2018 14  6 - 20 mg/dL Final   • Creatinine 03/19/2018 1.17  0.76 - 1.27 mg/dL Final   • Sodium 03/19/2018 133* 136 - 145 mmol/L Final   • Potassium 03/19/2018 4.7  3.5 - 5.2 mmol/L Final   • Chloride 03/19/2018 94* 98 - 107 mmol/L Final   • CO2 03/19/2018 26.9  22.0 - 29.0 mmol/L Final   • Calcium 03/19/2018 9.2  8.6 - 10.5 mg/dL Final   • Total Protein 03/19/2018 7.2  6.0 - 8.5 g/dL Final   • Albumin 03/19/2018 4.60  3.50 - 5.20 g/dL Final   • ALT (SGPT) 03/19/2018 23  1 - 41 U/L Final   • AST (SGOT) 03/19/2018 16  1 - 40 U/L Final   • Alkaline Phosphatase 03/19/2018 116  39 - 117 U/L Final   • Total Bilirubin 03/19/2018 0.5  0.1 - 1.2 mg/dL Final   • eGFR Non African Amer 03/19/2018 66  >60 mL/min/1.73 Final   • Globulin 03/19/2018 2.6  gm/dL Final   • A/G Ratio 03/19/2018 1.8  g/dL Final   • BUN/Creatinine Ratio 03/19/2018 12.0  7.0 - 25.0 Final   • Anion Gap 03/19/2018 12.1  mmol/L Final   • WBC 03/19/2018 7.17  4.50 - 10.70 10*3/mm3 Final   • RBC 03/19/2018 4.84  4.60 - 6.00 10*6/mm3 Final   • Hemoglobin 03/19/2018 14.9  13.7 - 17.6 g/dL Final   • Hematocrit 03/19/2018 43.7  40.4 - 52.2 % Final   • MCV 03/19/2018 90.3  79.8 - 96.2 fL Final   • MCH 03/19/2018 30.8  27.0 - 32.7 pg Final   • MCHC 03/19/2018 34.1  32.6 - 36.4 g/dL Final   • RDW 03/19/2018 12.9  11.5 - 14.5 % Final   • RDW-SD 03/19/2018 42.0  37.0 - 54.0 fl Final   • MPV 03/19/2018 9.5  6.0 - 12.0 fL Final   • Platelets 03/19/2018 247  140 - 500 10*3/mm3 Final   • Neutrophil % 03/19/2018 54.9  42.7 - 76.0 % Final   • Lymphocyte % 03/19/2018 16.6* 19.6 - 45.3 % Final   • Monocyte % 03/19/2018 4.3* 5.0 - 12.0 % Final   • Eosinophil % 03/19/2018 23.8* 0.3 - 6.2 % Final   • Basophil % 03/19/2018 0.4  0.0 - 1.5 % Final   • Immature Grans % 03/19/2018 0.0  0.0 - 0.5 % Final   • Neutrophils, Absolute 03/19/2018 3.93  1.90 - 8.10 10*3/mm3 Final   • Lymphocytes, Absolute 03/19/2018 1.19  0.90 - 4.80 10*3/mm3 Final   • Monocytes, Absolute 03/19/2018  0.31  0.20 - 1.20 10*3/mm3 Final   • Eosinophils, Absolute 03/19/2018 1.71* 0.00 - 0.70 10*3/mm3 Final   • Basophils, Absolute 03/19/2018 0.03  0.00 - 0.20 10*3/mm3 Final   • Immature Grans, Absolute 03/19/2018 0.00  0.00 - 0.03 10*3/mm3 Final       No results found.    Follow-up Appointments  No future appointments.      Discharge and Follow up Instructions:     I. ACTIVITIES:  1. Exercises:  ? Complete exercise program as taught by the hospital physical therapist 2 times per day  ? Exercise program will be advanced by the physical therapist  ? During the day be up ambulating every 2 hours (while awake) for short distances  ? Complete the ankle pump exercises at least 10 times per hour (while awake)  ? Elevate legs when in bed and for at least 30 minutes during the day.Use cold packs 20-30 minutes approximately 5 times per day. This should be done before and after completing your exercises and at any time you are experiencing pain/ stiffness in your operative extremity.      2. Activities of Daily Living:  ? No tub baths, hot tubs, or swimming pools for 4 weeks  ? May shower and let water run over the incision on post-operative day #5 if no drainage. Do not scrub or rub the incision. Simply let the water run over the incision and pat dry.    II. Restrictions  ? Continue  Posterior hip precautions as taught at the hospital  ? Your surgeon will discuss with you when you will be able to drive again. Usual guidelines are you are to be off pain medications prior to driving.  ? Weight bearing is as tolerated  ? First week stay inside on even terrain. May go up and down stairs one stair at a time utilizing the hand rail once cleared by physical therapy to do so.  ? After one week, you may venture outside (if cleared to do so by physical therapist).    III. Precautions:  ? Everyone that comes near you should wash their hands  ? No elective dental, genital-urinary, or colon procedures or surgical procedures for 12 weeks  after surgery unless absolutely necessary.  ?  If dental work or surgical procedure is deemed absolutely necessary, you will need to contact your surgeon as you will need to take antibiotics 1 hour prior to any dental work (including teeth cleanings).  ? Please discuss with your surgeon prophylactic antibiotics as the length of time this intervention will be necessary for you varies with each patient’s health history and situation.  ? Avoid sick people. If you must be around someone who is ill, they should wear a mask.  ? Avoid visits to the Emergency Room or Urgent Care. If you feel you need to go to the Emergency Room, please notify your Surgeon's office.  ? Stockings are to be worn for one week after surgery and are to be placed on in the morning and removed at night. Observe your skin when stocking is removed for any problems. Monitor the stockings to ensure that any swelling is not causing the stockings to become too tight. In this case, remove stockings immediately.      IV. INCISION CARE:  ? Wash your hands prior to dressing changes  ? Change the dressing as needed to keep incision clean and dry. Utilize dry gauze and paper tape. Avoid touching the side of the gauze that goes against the incision with your hands.  ? No creams or ointments to the incision  ? May remove dressing once the incision is free of drainage  ? Do not touch or pick at the incision  ? Check incision every day and notify surgeon immediately if any of the following signs or symptoms are noted:  o Increase in redness  o Increase in swelling around the incision and of the entire extremity  o Increase in pain  o Drainage oozing from the incision  o Pulling apart of the edges of the incision  o Increase in overall body temperature (greater than 100.5 degrees)    ?  Your Staples will be removed between 10-14 days postoperation.  This may be done by either the home health nurse, rehabilitation nurse or during your return visit to Dr. Valdez's  office.  You will then be instructed on how to care for the incision.  (Please call the office if your staples have not been removed within 14 days after surgery).       V. Medications:   1. Anticoagulants: You will be discharged on an anticoagulant. This is a prophylactic medication that helps prevent blood clots during your post-operative period.  You will be on  Aspirin 325 mg Enteric Coated every 12 hours  for  21 days.   ? While taking the anticoagulant, you should avoid taking any additional aspirin, ibuprofen (Advil or Motrin), Aleve (Naprosyn) or other non-steroidal anti-inflammatory medications.   ? Notify surgeon immediately if any shahana bleeding is noted in the urine, stool, emesis, or from the nose or the incision. Blood in the stool will often appear as black rather than red. Blood in urine may appear as pink. Blood in emesis may appear as brown/black like coffee grounds.  ? You will need to apply pressure for longer periods of time to any cuts or abrasions to stop bleeding  ? Avoid alcohol while taking anticoagulants    2. Stool Softeners: You will be at greater risk of constipation after surgery due to being less mobile and the pain medications.   ? Take stool softeners as instructed by your surgeon while on pain medications. Over the counter Colace 100 mg 1-2 capsules twice daily.   ? If stools become too loose or too frequent, please decreases the dosage or stop the stool softener.  ? If constipation occurs despite use of stool softeners, you are to continue the stool softeners and add a laxative (Milk of Magnesia 1 ounce daily as needed).  ? Dulcolax oral tabs or suppository, or a fleets enema can also be utilized for constipation and can be obtained over the counter.   ? If above interventions are unsuccessful in inducing bowel movements, please contact your surgeon's office / family physician's office.  ? Drink plenty of fluids, and eat fruits and vegetables during your recovery time    3. Pain  Medications utilized after surgery are narcotics and the law requires that the following information be given to all patients that are prescribed narcotics:  ? CLASSIFICATION: Pain medications are called Opioids and are narcotics  ? LEGALITIES: It is illegal to share narcotics with others and to drive within 24 hours of taking narcotics  ? POTENTIAL SIDE EFFECTS: Potential side effects of opioids include: nausea, vomiting, itching, dizziness, drowsiness, dry mouth, constipation, and difficulty urinating.  ? POTENTIAL ADVERSE EFFECTS:   o Opioid tolerance can develop with use of pain medications and this simply means that it requires more and more of the medication to control pain; however, this is seen more in patients that use opioids for longer periods of time.  o Opioid dependence can develop with use of Opioids and this simply means that to stop the medication can cause withdrawal symptoms; however, this is seen with patients that use Opioids for longer periods of time.  o Opioid addiction can develop with use of Opioids and the incidence of this is very unlikely in patients who take the medications as ordered and stop the medications as instructed.  o Opioid overdose can be dangerous, but is unlikely when the medication is taken as ordered and stopped when ordered. It is important not to mix opioids with alcohol or with and type of sedative such as Benadryl as this can lead to over sedation and respiratory difficulty.  ? DOSAGE:   o Pain medications will need to be taken consistently for the first week to decrease pain and promote adequate pain relief and participation in physical therapy.  o After the initial surgical pain begins to resolve, you may begin to decrease the pain medication. By the end of 6 weeks, you should be off of pain medications.  o Refills will not be given by the office during evening hours, on weekends, or after 6 weeks post-op.  o To seek refills on pain medications during the initial 6  week post-operative period, you must call the office 48 hours in advance to request the refill. The office will then notify you when to  the prescription. DO NOT wait until you are out of the medication to request a refill.    V. FOLLOW-UP VISITS:  ? You will need to follow up in the office with your surgeon in  4/4/2018. Please call this number [phone]  to schedule this appointment.  If you have any concerns or suspected complications prior to your follow up visit, please call your surgeons office. Do not wait until your appointment time if you suspect complications. These will need to be addressed in the office promptly.    Date: 3/20/2018    Jenna Valdez MD    CC: PRESTON Valera; MD Jenna Main, *

## 2018-03-20 NOTE — ANESTHESIA PROCEDURE NOTES
Airway  Urgency: elective    Date/Time: 3/20/2018 10:25 AM  Airway not difficult    General Information and Staff    Patient location during procedure: OR  Anesthesiologist: DINORAH WEINSTEIN  CRNA: PAIGE BOONE    Indications and Patient Condition  Indications for airway management: airway protection    Preoxygenated: yes  MILS not maintained throughout  Mask difficulty assessment: 1 - vent by mask    Final Airway Details  Final airway type: endotracheal airway      Successful airway: ETT  Cuffed: yes   Successful intubation technique: direct laryngoscopy  Facilitating devices/methods: anterior pressure/BURP  Endotracheal tube insertion site: oral  Blade: Cr  Blade size: #3  ETT size: 7.5 mm  Cormack-Lehane Classification: grade IIa - partial view of glottis  Placement verified by: chest auscultation   Measured from: lips  ETT to lips (cm): 21  Number of attempts at approach: 1    Additional Comments  Pre O2, SIAI

## 2018-03-20 NOTE — OP NOTE
ORTHOPAEDIC OPERATIVE NOTE    Facility: [facility]    Patient Name: Shreyas Lao     YOB: 1965    Date: 3/20/2018    Medical Record Number: [id number]    Attending Physician: Jenna Valdez, *    Date of Service: 3/20/2018    Pre-op Diagnosis:   Unilateral osteoarthritis resulting from hip dysplasia, left hip    Post-Op Diagnosis Codes:     * Unilateral osteoarthritis resulting from hip dysplasia, left hip [M16.32]    PROCEDURES PERFORMED: LEFT TOTAL HIP ARTHROPLASTY  utilizing a mini posterior approach with Depuy pinnacle Gription cluster hole sector shell size 52 mm outer diameter and a ceramax ceramic insert- 36 mm internal diameter, and Actis cementless collared femoral stem size # 6 standard offset with a Delta ceramic femoral head- 36 mm outer diameter, +  neck length (Depuy).     Surgeon(s):  MD Сергей Bonilla MD    Anesthesia: General  Anesthesiologist: Malik Fournier MD  CRNA: Ovidio Houser CRNA    Staff:   Circulator: Miladis Dudley RN  Scrub Person: Benji Fernández; Ivonne Torres  Vendor Representative: Radhames Mccauley  Assistant: Aida Pizarro  Orientee: Luz Pineda RN    Assistants :   Saadia Dasilva ( Certified Surgical First Assistant)      Сергей Us MD, Fellow   The services of a skilled first assistant were necessary for performing the procedure safely and expeditiously.  The first assist was present for the entire duration of the case and helped with positioning, retraction and closure of the incision.     Estimated Blood Loss: 200ml    Specimens: * No orders in the log *    COMPLICATIONS: Nil.      DRAINS: Nil.     INDICATIONS:  52 y.o. male who presented to my office with progressively worsening pain hip .  The patient has reached a point of disability and failed nonoperative treatment.  Evaluation was made with x rays and physical examination.  The patient's history was reviewed and preoperative medications were  reviewed specifically for anticoagulants. A risk assessment was made for any recent DVT or PE and infection risk.    He is known to me from his right RAUL for dysplasia and has been doing well.   The patient's options and alternatives were discussed in detail with the patient and  family . The patient is indicated for a  total hip replacement. The patient elected to proceed with a total hip arthroplasty. Likely risks and benefits of the procedure including, but not limited to infection, DVT, pulmonary embolism, leg length discrepancy, recurrent dislocation, periprosthetic fractures, possibility of injury to nerves or vessels, risk for cardiac events, MI, stroke, post operative delirium,  mortality, morbidity, and immobility syndrome have been discussed in detail. Despite the risks involved, the patient and family elected to proceed. An informed consent has been obtained, and patient  has been scheduled for surgery.   Patient was seen in the preoperative holding area and the operative site was marked.      DESCRIPTION OF PROCEDURE: The patient has been transferred to [facility] Operating Room. Preoperative antibiotics were given in the form of  Kefzol  IV according to SCIP protocol prior to the incision.  After achieving adequate general anesthesia, the patient was placed in the lateral position utilizing a pegboard. All bony prominences were padded well. The operative hip was prepped and draped in the usual sterile fashion. Surgical time-out was done. Correct patient, surgical side and site were identified. Tranexamic acid was given intravenously just prior to the incision.      A skin incision was made centering over the greater trochanter for a mini posterior  approach. Skin and subcutaneous tissue were incised and the deep fascia was incised in line with the skin incision. The gluteus allyssa muscle fibers were split in their direction. Posterior aspect of the hip joint was identified. An L-shaped  arthrotomy was made along the superior border of the pyriformis tendon and along the posterior aspect of the greater trochanter. The capsule was released. There was a small effusion. The femoral head was dislocated. There was complete loss of articular cartilage in the superior weight bearing area of femoral head , The femoral head was dysplastic and deformed proximally migrated varus no  Neck.  The femoral neck was trimmed as per the preoperative templating utilizing a reciprocating saw. Followed by this, the femoral head was removed  with a tenaculum. The femoral head measured 45  mm in its outer diameter. The acetabular cavity was inspected..     The Acetabular cavity was appropriately exposed with retractors.  Labrum was excised and pulvinar was excised. The cavity was progressively reamed maintaining the correct inclination and version. Adequate fit was found with a size 51 reamer. Care was taken to keep the acetabulum in the native acetabulum.  A Delton  hemispherical cluster hole sector shell size 52 was obtained and seated maintaining correct inclination and version. There was excellent stability.  The trial liner was placed and attention was directed to the proximal femur.      The femoral neck was elevated with the help of a neck elevating Rajput retractor and proximal femur was entered with a box chisel, followed by canal finder, followed by serial broaching. Adequate fit was found to be obtained with a size 6 broach.Trial reduction was performed. Reduction was found to be satisfactory with good restoration of leg lengths.  Range of motion was checked and there was excellent range of motion with good stability throughout the range for flexion, adduction , internal rotation and external rotation. There was no sign of impingement.  The trial was dislocated and the trial liner was replaced with a zero degree ceramic liner 36 mm internal diameter. The broach was replaced with a size 6 Actis standard neck  collared cementless  femoral stem, checked for stability. The delta ceramic head 36 mm outer diameter with a + 5 mm neck length was seated into position, checked again for stability and the hip was reduced. Reduction was found to be satisfactory. The hip joint was thoroughly irrigated with saline and soft tissue hemostasis was secured. The posterior capsule was re-anchored to the greater trochanter with the help of FiberWire sutures and drill holes made into the greater trochanter. The sponge count and needle count was correct.  The incision was closed in layers with Ethibond, vicryl and staples. Sterile dressings were placed and the patient was transferred to the recovery room in a stable condition.      The patient prior to closure received periarticular injection of  Naropin, clonidine, and ketorolac mixture. The patient tolerated the procedure well and had adequate distal pulses and good capillary refill. The patient was then transferred to the recovery room and then later to the floor without any complications.     The patient will receive postoperative antibiotics in the form of Kefzol IV q.8 h. within the first 24 hours for 2 more doses according to SCIP protocol.   ASA for DVT prophylaxis will begin in the morning.      I discussed these satisfactory performance of the procedure with the patient's family and discussed with them the postoperative management.    Jenna Valdez MD     Date: 3/20/2018  Time: 11:59 AM    CC: PRESTON Valera; MD Jenna Main, *

## 2018-03-20 NOTE — PLAN OF CARE
Problem: Patient Care Overview  Goal: Plan of Care Review   03/20/18 1548   OTHER   Outcome Summary Pt is s/p L RAUL and presents with pain and limited ROM and difficulty walking. Pt would benefit fromPT to address these impairments.

## 2018-03-20 NOTE — H&P
FAROOQ had a right total hip arthroplasty  on 10/31/2017.  He is now 3.5 months status post operation.    He is doing well.  He is ambulating with cane. He reports over the last 1-2 weeks increasing pain in his low back and SI joints on bilateral hips. He denies and trauma, fevers or chills. He had no issues with wound healing post op.  He believes his gait is off due to leg length difference causing his pain.   He is with his wife at todays visit.  The patient continues to complain of pain in his left hip. The patient states that he is having a sharp pain which he rates at a 8 on a scale from 1-10.  The pain occurs intermittently.  The pain is located in the front of the hip.  The pain occurs on the right side only.  The pain is not radiating.  The patient states that rest makes it better, while activity, standing and walking makes it worse.    He is very active.  He continues to work.  He works as a rendoror.  The pain has caused him significant difficulty with activities of daily living.  He has difficulty ambulating distances.  He uses a cane for ambulation.  He has difficulty putting his shoes and socks on.  Difficulty getting in and out of the car.  He complains of night pain.  He complains of a limp.  He has always had some limp, but this is worse with the pain.  He has a history of dysplastic  hips.  He denies any history of MRSA.  Denies any history of DVT.  Review of Systems:  Positive for: Eyes or Vision Problems and Joint Pain.    Patient denies: Abdominal Pain, Bleeding, Chest Pain, Convulsions/Seizure, Decreased Motion, Depression, Difficulty Swallowing, Easy Bruisability, Emotional Disturbances, Fecal Incontinence, Fever/Chills, Headaches, Increased Thirst, Increased Hunger, Insomnia, Nausea/Vomiting, Night Sweats, Poor Balance, Persistent Cough, Rash, Shortness of Breath, Shortness of Breath While Lying down, Skin Problems, Urinary Retention and Weakness.  Allergies:  * no known  allergies  Medications:  pravastatin sodium 40 mg oral tablet (pravastatin sodium) once daily  benazepril hcl 40 mg oral tablet (benazepril hcl) once daily  phentermine hcl 37.5 mg oral capsule (phentermine hcl) once daily  omeprazole 40 mg oral capsule delayed release (omeprazole) once daily  metoprolol tartrate 50 mg oral tablet (metoprolol tartrate) 2x day  cyclobenzaprine hcl 10 mg oral tablet (cyclobenzaprine hcl) 3x day  ibuprofen 800 mg oral tablet (ibuprofen) every 8 hours  amlodipine besylate 10 mg oral tablet (amlodipine besylate) once daily  Patient History of:  BLOOD CLOTS/EMBOLISM - NEGATIVE  Surgical History:  right Total Hip Arthroplasty-[[id number]]   No Previous Non-orthopaedic Surgery-history   Known Family History of:  heart disease-father  stroke-father  cancer-father  Social History:  Social history taken on 02/19/2018 states FAROOQ RILEY is a  52 year old male.  He currently uses tobacco products.  1 PACK PER DAY      Past medical, social, family histories and ROS reviewed today with the patient and changes documented in the chart (02/19/2018).  Referring PRESTON Espinosa  PCP ABHAY Bales    Physical Exam  Height:  62 in.    Weight:  252 lbs.     BMI:  46.26  Pulse:  63  Blood pressure:  134 / 86 mm Hg    Gait: antalgic               Ambulation: with a cane      Mental/HEENT/Cardio/Skin  The patient's general appearance was well nourished, well hydrated, no acute distress.  Orientation was alert and oritented x 3.  The patient's mood was normal.  A head exam revealed normocephalic/atraumatic.  An eye exam revealed pupils equal.  Pulmonary exam shows normal air exchange, no labored breathing, or shortness of breath.  A skin exam showed normal temperature and color in the area of examination.      Right Hip/Pelvis  The leg lengths are equal.  Normal:   Neurovascular status is intact.  Sensation in hip is normal.  DP Pulse is 3+.  PT PULSE is 3+.  Capillary Refill  is normal.    ROM  Internal Rotation: >20  External Rotation: >30  Abduction: >25  Adduction: >10  Flexion: >100  Extension: >20  Patient shows no signs of DVT.     Tenderness  Location: none  Radiation of Pain: none  Pain w/ Palpation: none  Straight Leg Raise: negative    Strength  Quad: normal  Abduction: normal  Adduction: normal  Flexion: normal  Extension: normal      Left Hip/Pelvis  Normal:  Neurovascular status is intact.  Sensation in hip is normal.  DP Pulse is 3+.  PT PULSE is 3+.  Capillary Refill is normal.  Reflexes are normal.    ROM  Internal Rotation: <30  External Rotation: <40  Abduction: <45  Adduction: <15  Flexion: <100  Extension: <5    Tenderness  Location: groin  Radiation of Pain: anterior thigh  Pain w/ Palpation: none  Giulia Test: negative  Impingement: negative  Straight Leg Raise: negative    Strength  Quad: normal  Abduction: normal  Adduction: normal  Flexion: normal  Extension: normal  Positive Stinchfield sign  Positive Trendelenburg sign      Imaging/Diagnostic Studies  X-rays of the Right Hip [AP;AP pelvis;Frog Lateral] were ordered and reviewed today.    RAUL x-rays show the prosthesis to be well aligned and seated in all planes, everything well-aligned, and, good ingrowth.     X-rays of the left hip/pelvisX-rays show osteoarthritis is severe.  bone on bone arthrosis.     Impression  Aftercare following joint replacement surgery (IJU89-Y44.1)  Right artificial hip joint presence ([id number]-X26.641)  Left hip osteoarthritis resulting from hip dysplasia    Plan  For the R hip:  Encouraged to increase activity as tolerated.  Continue home exercise program.  Discussed regarding current guidelines for antibiotic prophylaxis in case of dental work up.  Follow up in one year for routine xray.  Left hip  The patient has reached the point of disability and failed nonoperative management.  The patient is indicated for a left total hip replacement . Options and alternatives have been discussed  in detail with the patient and family.  The likely Risks and benefits of the procedure including but not limited to infection, DVT, pulmonary embolism, leg length discrepancy, recurrent dislocation, periprosthetic fractures, possibility of injury to nerves or vessels have been discussed in detail.   Despite the risks involved the patient would like to proceed.  Surgery is being scheduled for a left  total hip arthroplasty by posterior approach at [facility] on March 20, 2018.  Plan for aspirin for DVT prophylaxis.  Tramadol 50 mg 1 p.o. q.12 , # 60 , no refills to be taken with food discussed regarding side effects.  E prescription given.  Neurontin 300 mg 1 p.o. q.12 to be taken for 2 months for hypersensitivity.  E-prescription called in.  We discussed regarding side effects.  We talked today for less than 3 minutes about a smoking cessation program.  This discussion included the risk and increase complications that occur from continuation of smoking.          We discussed the benefits of surgical intervention, as well as alternative treatments.  Potential surgical risks and complications include but are not limited to DVT, infection, neurovascular injury, fracture, implant wear, failure, possible need for revision surgery, loss of motion, dislocation, limb length changes.  Sufficient opportunity was given to discuss the condition and treatment plan with the doctor, and all questions were answered for the patient.  Nonsurgical measures such as injections, medications, or physical therapy may not offer significant relief to this patient.  The discussion lasted 30 minutes.      Shreyas should follow up with JENNA BUENROSTRO MD post op.    Addendum:    I have personally examined this patient. I agree with the above findings and treatment  plan.     Jenna Buenrostro MD  3/20/2018

## 2018-03-20 NOTE — PLAN OF CARE
Problem: Patient Care Overview  Goal: Plan of Care Review  Outcome: Ongoing (interventions implemented as appropriate)   03/20/18 1554   OTHER   Outcome Summary pain well controlled with PO pain medication. ambulates with assist x1. dressing changed due to excessive drainage. up to chair. worked well with PT. d/c when appropriate. educated about BP monitoring.    Coping/Psychosocial   Plan of Care Reviewed With patient   Plan of Care Review   Progress improving     Goal: Individualization and Mutuality  Outcome: Ongoing (interventions implemented as appropriate)    Goal: Discharge Needs Assessment  Outcome: Ongoing (interventions implemented as appropriate)    Goal: Interprofessional Rounds/Family Conf  Outcome: Ongoing (interventions implemented as appropriate)      Problem: Fall Risk (Adult)  Goal: Identify Related Risk Factors and Signs and Symptoms  Outcome: Outcome(s) achieved Date Met: 03/20/18    Goal: Absence of Fall  Outcome: Ongoing (interventions implemented as appropriate)      Problem: Hip Arthroplasty (Total, Partial) (Adult)  Goal: Signs and Symptoms of Listed Potential Problems Will be Absent, Minimized or Managed (Hip Arthroplasty)  Outcome: Ongoing (interventions implemented as appropriate)    Goal: Anesthesia/Sedation Recovery  Outcome: Ongoing (interventions implemented as appropriate)

## 2018-03-20 NOTE — THERAPY EVALUATION
Acute Care - Physical Therapy Initial Evaluation  [facility]     Patient Name: Shreyas Lao  : 1965  MRN: [id number]  Today's Date: 3/20/2018                Admit Date: 3/20/2018    Visit Dx:     ICD-10-CM ICD-9-CM   1. Difficulty walking R26.2 719.7     Patient Active Problem List   Diagnosis   • Osteoarthritis resulting from right hip dysplasia   • COPD (chronic obstructive pulmonary disease)   • HTN (hypertension)   • GERD (gastroesophageal reflux disease)   • Unilateral osteoarthritis resulting from hip dysplasia, left hip     Past Medical History:   Diagnosis Date   • Abdominal pain    • Acid reflux    • Benign essential hypertension    • Bradycardia    • Chest pain    • COPD (chronic obstructive pulmonary disease)    • Cough    • Dyslipidemia    • Fatigue    • Hip dysplasia    • Hyperlipidemia    • Hypertension    • Infection of skin    • Infection of tooth    • Obesity    • Osteoarthritis    • Physical exam    • Pleurisy    • Pneumonia    • Preoperative clearance    • Radicular pain of right lower extremity    • Right hip pain    • Risk factors for obstructive sleep apnea    • Sciatic pain    • SOB (shortness of breath)    • Tobacco abuse    • Weight gain, abnormal      Past Surgical History:   Procedure Laterality Date   • CARDIAC CATHETERIZATION  2010    Completed by Dr. Osei Stevens which showed no significant stenosis of any of the coronary arteries   • TOTAL HIP ARTHROPLASTY Right 10/31/2017    Procedure: RIGHT TOTAL HIP ARTHROPLASTY POSTERIOR;  Surgeon: Jenna Valdez MD;  Location: [facility] OR;  Service:         PT ASSESSMENT (last 72 hours)      Physical Therapy Evaluation     Row Name 18 1540          PT Evaluation Time/Intention    Subjective Information complains of;pain  -KH     Document Type evaluation  -KH     Patient Effort good  -KH     Symptoms Noted During/After Treatment increased pain  -KH     Row Name 18 0031          General Information     Patient Observations alert;cooperative;agree to therapy  -     General Observations of Patient in bed, family present  -     Prior Level of Function independent:  -     Equipment Currently Used at Home cane, straight  -     Existing Precautions/Restrictions fall;hip, posterior  -     Row Name 03/20/18 1540          Relationship/Environment    Primary Source of Support/Comfort spouse  -     Lives With spouse  -     Row Name 03/20/18 1540          Resource/Environmental Concerns    Current Living Arrangements home/apartment/condo  -     Row Name 03/20/18 1540          Home Main Entrance    Number of Stairs, Main Entrance one  -     Row Name 03/20/18 1540          Cognitive Assessment/Interventions    Additional Documentation Cognitive Assessment/Intervention (Group)  -     Row Name 03/20/18 1540          Cognitive Assessment/Intervention- PT/OT    Affect/Mental Status (Cognitive) WNL  -     Orientation Status (Cognition) oriented x 4  -     Personal Safety Interventions fall prevention program maintained;gait belt;nonskid shoes/slippers when out of bed  [facility] Name 03/20/18 1540          Mobility Assessment/Treatment    Extremity Weight-bearing Status left lower extremity  -     Left Lower Extremity (Weight-bearing Status) weight-bearing as tolerated (WBAT)  -     Row Name 03/20/18 1540          Bed Mobility Assessment/Treatment    Bed Mobility Assessment/Treatment bed mobility (all) activities  -     [place] Level (Bed Mobility) independent  -     Assistive Device (Bed Mobility) bed rails  [facility] Name 03/20/18 1540          Transfer Assessment/Treatment    Transfer Assessment/Treatment sit-stand transfer;stand-sit transfer  -     Sit-Stand [place] (Transfers) contact guard  -     Stand-Sit [place] (Transfers) contact guard  -     Row Name 03/20/18 1540          Sit-Stand Transfer    Assistive Device (Sit-Stand Transfers) walker, front-wheeled  [facility]  Name 03/20/18 1540          Stand-Sit Transfer    Assistive Device (Stand-Sit Transfers) walker, front-wheeled  -     Row Name 03/20/18 1540          Gait/Stairs Assessment/Training    [place] Level (Gait) contact guard  -     Assistive Device (Gait) walker, front-wheeled  -     Distance in Feet (Gait) 200  -KH     Deviations/Abnormal Patterns (Gait) antalgic  -     Row Name 03/20/18 1540          General ROM    GENERAL ROM COMMENTS WFL  -     Row Name 03/20/18 1540          General Assessment (Manual Muscle Testing)    Comment, General Manual Muscle Testing (MMT) Assessment WFL  -     Row Name 03/20/18 1540          Motor Assessment/Intervention    Additional Documentation Therapeutic Exercise Interventions (Group)  -     Row Name 03/20/18 1540          Therapeutic Exercise    Comment (Therapeutic Exercise) l RAUL rotocol x 10 reps  -     Row Name 03/20/18 1540          Pain Assessment    Additional Documentation Pain Scale: Numbers Pre/Post-Treatment (Group)  -     Row Name 03/20/18 1540          Pain Scale: Numbers Pre/Post-Treatment    Pain Scale: Numbers, Pretreatment 7/10  -KH     Pain Scale: Numbers, Post-Treatment 8/10  -KH     Pain Location - Side Left  -     Pain Location hip  -     Pain Intervention(s) Cold applied;Repositioned;Ambulation/increased activity  -     Row Name             Wound 03/20/18 1100 hip incision    Wound - Properties Group Date first assessed: 03/20/18  -BM Time first assessed: 1100  -BM Location: hip  -BM Type: incision  -BM    Row Name 03/20/18 1540          Plan of Care Review    Plan of Care Reviewed With patient  -     Row Name 03/20/18 1540          Physical Therapy Clinical Impression    Patient/Family Goals Statement (PT Clinical Impression) return  home   -     Criteria for Skilled Interventions Met (PT Clinical Impression) treatment indicated  -     Impairments Found (describe specific impairments) gait, locomotion, and balance;ROM  -KH      Rehab Potential (PT Clinical Summary) good, to achieve stated therapy goals  -     Row Name 03/20/18 1540          Physical Therapy Goals    Bed Mobility Goal Selection (PT) bed mobility, PT goal 1  -KH     Transfer Goal Selection (PT) transfer, PT goal 1  -KH     Gait Training Goal Selection (PT) gait training, PT goal 1  -KH     Stairs Goal Selection (PT) stairs, PT goal 1  -KH     Additional Documentation Stairs Goal Selection (PT) (Row)  -     Row Name 03/20/18 1540          Bed Mobility Goal 1 (PT)    Activity/Assistive Device (Bed Mobility Goal 1, PT) bed mobility activities, all  -KH     [place] Level/Cues Needed (Bed Mobility Goal 1, PT) independent  -KH     Time Frame (Bed Mobility Goal 1, PT) 3 days  -     Row Name 03/20/18 1540          Transfer Goal 1 (PT)    Activity/Assistive Device (Transfer Goal 1, PT) transfers, all;walker, rolling  -KH     [place] Level/Cues Needed (Transfer Goal 1, PT) independent  -KH     Time Frame (Transfer Goal 1, PT) 3 days  -     Row Name 03/20/18 1540          Gait Training Goal 1 (PT)    Activity/Assistive Device (Gait Training Goal 1, PT) gait (walking locomotion);walker, rolling  -KH     [place] Level (Gait Training Goal 1, PT) supervision required  -KH     Distance (Gait Goal 1, PT) 250  -KH     Time Frame (Gait Training Goal 1, PT) 3 days  -     Row Name 03/20/18 1540          Stairs Goal 1 (PT)    Activity/Assistive Device (Stairs Goal 1, PT) stairs, all skills  -KH     [place] Level/Cues Needed (Stairs Goal 1, PT) contact guard assist  -KH     Number of Stairs (Stairs Goal 1, PT) 1  -KH     Time Frame (Stairs Goal 1, PT) 3 days  -     Row Name 03/20/18 1540          Patient Education Goal (PT)    Activity (Patient Education Goal, PT) HEP and precautions  -KH     [place]/Cues/Accuracy (Memory Goal 2, PT) demonstrates adequately  -KH     Time Frame (Patient Education Goal, PT) 3 days  -     Row Name 03/20/18 1540           Positioning and Restraints    Pre-Treatment Position in bed  -     Post Treatment Position chair  -     In Chair reclined;call light within reach;encouraged to call for assist;with family/caregiver;notified nsg  -RACHEL     Row Name 03/20/18 1540          Living Environment    Home Accessibility stairs to enter home  -       User Key  (r) = Recorded By, (t) = Taken By, (c) = Cosigned By    Initials Name Provider Type     Flor Mcclain, PT Physical Therapist    SAMUEL Dudley RN Registered Nurse          Physical Therapy Education     Title: PT OT SLP Therapies (Done)     Topic: Physical Therapy (Done)     Point: Mobility training (Done)    Learning Progress Summary     Learner Status Readiness Method Response Comment Documented by    Patient Done Acceptance IVAN DAVIS,NR   03/20/18 1548          Point: Home exercise program (Done)    Learning Progress Summary     Learner Status Readiness Method Response Comment Documented by    Patient Done Acceptance IVAN DAVIS,NR   03/20/18 1548          Point: Body mechanics (Done)    Learning Progress Summary     Learner Status Readiness Method Response Comment Documented by    Patient Done Acceptance IVAN DAVIS,NR   03/20/18 1548          Point: Precautions (Done)    Learning Progress Summary     Learner Status Readiness Method Response Comment Documented by    Patient Done Acceptance IVAN DAVIS,NR   03/20/18 1548                      User Key     Initials Effective Dates Name Provider Type [facility] 05/18/15 -  Flor Mcclain, PT Physical Therapist PT                PT Recommendation and Plan  Anticipated Discharge Disposition (PT): home with home health care  Planned Therapy Interventions (PT Eval): bed mobility training, gait training, home exercise program, patient/family education, ROM (range of motion), strengthening, transfer training, stair training  Therapy Frequency (PT Clinical Impression): 2 times/day  Outcome Summary/Treatment Plan  (PT)  Anticipated Discharge Disposition (PT): home with home health care  Plan of Care Reviewed With: patient  Outcome Summary: Pt is s/p L RAUL and presents with pain and limited ROM and difficulty walking. Pt would benefit fromPT to address these impairments.           Outcome Measures     Row Name 03/20/18 1600             How much help from another person do you currently need...    Turning from your back to your side while in flat bed without using bedrails? 4  -KH      Moving from lying on back to sitting on the side of a flat bed without bedrails? 4  -KH      Moving to and from a bed to a chair (including a wheelchair)? 3  -KH      Standing up from a chair using your arms (e.g., wheelchair, bedside chair)? 3  -KH      Climbing 3-5 steps with a railing? 3  -KH      To walk in hospital room? 3  -KH      AM-PAC 6 Clicks Score 20  -KH         Functional Assessment    Outcome Measure Options AM-PAC 6 Clicks Basic Mobility (PT)  -        User Key  (r) = Recorded By, (t) = Taken By, (c) = Cosigned By    Initials Name Provider Type    RACHEL Mcclain, PABLO Physical Therapist           Time Calculation:         PT Charges     Row Name 03/20/18 1540             Time Calculation    Start Time 1540  -      Stop Time 1605  -KH      Time Calculation (min) 25 min  -KH      PT Received On 03/20/18  -      PT - Next Appointment 03/21/18  -      PT Goal Re-Cert Due Date 03/23/18  -        User Key  (r) = Recorded By, (t) = Taken By, (c) = Cosigned By    Initials Name Provider Type    RACHEL Mcclain PT Physical Therapist          Therapy Charges for Today     Code Description Service Date Service Provider Modifiers Qty    05205489014 HC PT MOBILITY CURRENT 3/20/2018 Flor Mcclain, PT GP, CJ 1    16292709440 HC PT MOBILITY PROJECTED 3/20/2018 Flor Mcclain, PT GP, CI 1    12066426927 HC PT EVAL MOD COMPLEXITY 2 3/20/2018 Flor Mcclain, PT GP 1    62596554239 HC PT THER PROC  EA 15 MIN 3/20/2018 Flor Mcclain, PT GP 1          PT G-Codes  Outcome Measure Options: AM-PAC 6 Clicks Basic Mobility (PT)  Score: 20  Functional Limitation: Mobility: Walking and moving around  Mobility: Walking and Moving Around Current Status (): At least 20 percent but less than 40 percent impaired, limited or restricted  Mobility: Walking and Moving Around Goal Status (): At least 1 percent but less than 20 percent impaired, limited or restricted      Flor Mcclain, PT  3/20/2018

## 2018-03-21 VITALS
SYSTOLIC BLOOD PRESSURE: 114 MMHG | TEMPERATURE: 98.3 F | BODY MASS INDEX: 44.83 KG/M2 | HEART RATE: 80 BPM | DIASTOLIC BLOOD PRESSURE: 67 MMHG | WEIGHT: 253 LBS | HEIGHT: 63 IN | RESPIRATION RATE: 18 BRPM | OXYGEN SATURATION: 96 %

## 2018-03-21 LAB
ANION GAP SERPL CALCULATED.3IONS-SCNC: 14.9 MMOL/L
BASOPHILS # BLD AUTO: 0.01 10*3/MM3 (ref 0–0.2)
BASOPHILS NFR BLD AUTO: 0.1 % (ref 0–1.5)
BUN BLD-MCNC: 15 MG/DL (ref 6–20)
BUN/CREAT SERPL: 16 (ref 7–25)
CALCIUM SPEC-SCNC: 9 MG/DL (ref 8.6–10.5)
CHLORIDE SERPL-SCNC: 95 MMOL/L (ref 98–107)
CO2 SERPL-SCNC: 22.1 MMOL/L (ref 22–29)
CREAT BLD-MCNC: 0.94 MG/DL (ref 0.76–1.27)
DEPRECATED RDW RBC AUTO: 44.2 FL (ref 37–54)
EOSINOPHIL # BLD AUTO: 0.11 10*3/MM3 (ref 0–0.7)
EOSINOPHIL NFR BLD AUTO: 1.1 % (ref 0.3–6.2)
ERYTHROCYTE [DISTWIDTH] IN BLOOD BY AUTOMATED COUNT: 13.2 % (ref 11.5–14.5)
GFR SERPL CREATININE-BSD FRML MDRD: 85 ML/MIN/1.73
GLUCOSE BLD-MCNC: 166 MG/DL (ref 65–99)
HCT VFR BLD AUTO: 35.8 % (ref 40.4–52.2)
HGB BLD-MCNC: 11.9 G/DL (ref 13.7–17.6)
IMM GRANULOCYTES # BLD: 0 10*3/MM3 (ref 0–0.03)
IMM GRANULOCYTES NFR BLD: 0 % (ref 0–0.5)
LYMPHOCYTES # BLD AUTO: 0.57 10*3/MM3 (ref 0.9–4.8)
LYMPHOCYTES NFR BLD AUTO: 5.5 % (ref 19.6–45.3)
MCH RBC QN AUTO: 30.5 PG (ref 27–32.7)
MCHC RBC AUTO-ENTMCNC: 33.2 G/DL (ref 32.6–36.4)
MCV RBC AUTO: 91.8 FL (ref 79.8–96.2)
MONOCYTES # BLD AUTO: 0.51 10*3/MM3 (ref 0.2–1.2)
MONOCYTES NFR BLD AUTO: 4.9 % (ref 5–12)
NEUTROPHILS # BLD AUTO: 9.2 10*3/MM3 (ref 1.9–8.1)
NEUTROPHILS NFR BLD AUTO: 88.4 % (ref 42.7–76)
PLATELET # BLD AUTO: 239 10*3/MM3 (ref 140–500)
PMV BLD AUTO: 9.7 FL (ref 6–12)
POTASSIUM BLD-SCNC: 4.7 MMOL/L (ref 3.5–5.2)
RBC # BLD AUTO: 3.9 10*6/MM3 (ref 4.6–6)
SODIUM BLD-SCNC: 132 MMOL/L (ref 136–145)
WBC NRBC COR # BLD: 10.4 10*3/MM3 (ref 4.5–10.7)

## 2018-03-21 PROCEDURE — 25010000003 CEFAZOLIN IN DEXTROSE 2-4 GM/100ML-% SOLUTION: Performed by: ORTHOPAEDIC SURGERY

## 2018-03-21 PROCEDURE — 80048 BASIC METABOLIC PNL TOTAL CA: CPT | Performed by: ORTHOPAEDIC SURGERY

## 2018-03-21 PROCEDURE — 85025 COMPLETE CBC W/AUTO DIFF WBC: CPT | Performed by: ORTHOPAEDIC SURGERY

## 2018-03-21 RX ADMIN — BENAZEPRIL HYDROCHLORIDE 40 MG: 40 TABLET, FILM COATED ORAL at 09:11

## 2018-03-21 RX ADMIN — HYDROCODONE BITARTRATE AND ACETAMINOPHEN 2 TABLET: 7.5; 325 TABLET ORAL at 07:46

## 2018-03-21 RX ADMIN — HYDROCODONE BITARTRATE AND ACETAMINOPHEN 2 TABLET: 7.5; 325 TABLET ORAL at 10:21

## 2018-03-21 RX ADMIN — HYDROCODONE BITARTRATE AND ACETAMINOPHEN 2 TABLET: 7.5; 325 TABLET ORAL at 03:22

## 2018-03-21 RX ADMIN — CEFAZOLIN SODIUM 2 G: 2 INJECTION, SOLUTION INTRAVENOUS at 02:00

## 2018-03-21 RX ADMIN — ASPIRIN 325 MG: 325 TABLET, DELAYED RELEASE ORAL at 07:47

## 2018-03-21 RX ADMIN — OMEPRAZOLE 40 MG: 40 CAPSULE, DELAYED RELEASE ORAL at 09:12

## 2018-03-21 NOTE — PROGRESS NOTES
Orthopedic Progress Note      Patient: Shreyas Lao  YOB: 1965     Date of Admission: 3/20/2018  7:25 AM Medical Record Number: [id number]     Attending Physician: Jenna Valdez, *    Status Post:  LT TOTAL HIP ARTHROPLASTY Post Operative Day Number: 1    Subjective : No new orthopaedic complaints     Pain Relief: some relief with present medication.     Systemic Complaints: No Complaints  Vitals:    03/20/18 1900 03/20/18 2300 03/21/18 0323 03/21/18 0708   BP: 141/77 179/88 137/78 114/67   BP Location: Left arm Right arm Right arm Right arm   Patient Position: Lying Lying Lying Lying   Pulse: 74 84 71 80   Resp: 16 16 16 18   Temp:  98.3 °F (36.8 °C) 98.4 °F (36.9 °C) 98.3 °F (36.8 °C)   TempSrc:  Oral Oral Oral   SpO2: 95% 97% 96% 96%   Weight:       Height:           Physical Exam: 52 y.o. male    General Appearance:       Alert, cooperative, in no acute distress                  Extremities:    Dressing Clean, Dry and Intact         Incision healthy without signs or symptoms of infections         No clinical sign of DVT        Able to do good movements of digits    Pulses:   Pulses palpable and equal bilaterally           Diagnostic Tests:       Results from last 7 days  Lab Units 03/21/18  0408 03/19/18  1334   WBC 10*3/mm3 10.40 7.17   HEMOGLOBIN g/dL 11.9* 14.9   HEMATOCRIT % 35.8* 43.7   PLATELETS 10*3/mm3 239 247       Results from last 7 days  Lab Units 03/21/18  0408 03/19/18  1334   SODIUM mmol/L 132* 133*   POTASSIUM mmol/L 4.7 4.7   CHLORIDE mmol/L 95* 94*   CO2 mmol/L 22.1 26.9   BUN mg/dL 15 14   CREATININE mg/dL 0.94 1.17   GLUCOSE mg/dL 166* 100*   CALCIUM mg/dL 9.0 9.2       Results from last 7 days  Lab Units 03/19/18  1334   INR  0.99   APTT seconds 30.7     No results found for: CRYSTAL]  No results found for: CULTURE]  No results found for: URICACID]  No results found.      Current Medications:  Scheduled Meds:  amLODIPine 10 mg Oral Nightly   aspirin 325 mg Oral Q12H    benazepril 40 mg Oral QAM   metoprolol tartrate 50 mg Oral Q12H   omeprazole 40 mg Oral QAM AC   pantoprazole 40 mg Oral QAM   pramipexole 0.125 mg Oral Nightly     Continuous Infusions:  sodium chloride 100 mL/hr     PRN Meds:.•  acetaminophen  •  bisacodyl  •  bisacodyl  •  cyclobenzaprine  •  diphenhydrAMINE **OR** diphenhydrAMINE  •  docusate sodium  •  HYDROcodone-acetaminophen  •  HYDROcodone-acetaminophen  •  HYDROmorphone **AND** naloxone  •  ipratropium-albuterol  •  melatonin  •  ondansetron **OR** ondansetron ODT **OR** ondansetron  •  sodium chloride    Assessment: Status post  LT TOTAL HIP ARTHROPLASTY    Patient Active Problem List   Diagnosis   • Osteoarthritis resulting from right hip dysplasia   • COPD (chronic obstructive pulmonary disease)   • HTN (hypertension)   • GERD (gastroesophageal reflux disease)   • Unilateral osteoarthritis resulting from hip dysplasia, left hip       PLAN:   Continues current post-op course  Anticoagulation: Aspirin started   Dressing Change prn  Mobilize with PT as tolerated per protocol    Weight Bearing: WBAT  Discharge Plan: OK to plan for discharge in  today to home and home health  from orthopadic perspective.    Jenna Valdez MD    Date: 3/21/2018    Time: 7:35 AM

## 2018-03-21 NOTE — PLAN OF CARE
Problem: Patient Care Overview  Goal: Plan of Care Review  Outcome: Ongoing (interventions implemented as appropriate)   03/21/18 0642   OTHER   Outcome Summary post op hip, VSS, ambulatingin room, pt voices adequate pain control, voiding wihtout difficulty, dressing with drainage, educated on the importance of BP monitoring related to history of HTN   Coping/Psychosocial   Plan of Care Reviewed With patient   Plan of Care Review   Progress improving     Goal: Individualization and Mutuality  Outcome: Ongoing (interventions implemented as appropriate)   03/21/18 0642   Individualization   Patient Specific Preferences none stated     Goal: Discharge Needs Assessment  Outcome: Ongoing (interventions implemented as appropriate)   03/20/18 0753 03/20/18 1559 03/20/18 1645   Discharge Needs Assessment   Readmission Within the Last 30 Days --  --  no previous admission in last 30 days   Patient/Family Anticipates Transition to --  home with help/services --    Patient/Family Anticipated Services at Transition --  none --    Transportation Anticipated --  family or friend will provide --    Disability   Equipment Currently Used at Home cane, straight;grab bar;shower chair --  --        Problem: Fall Risk (Adult)  Goal: Absence of Fall  Outcome: Ongoing (interventions implemented as appropriate)   03/21/18 0642   Fall Risk (Adult)   Absence of Fall achieves outcome       Problem: Hip Arthroplasty (Total, Partial) (Adult)  Goal: Signs and Symptoms of Listed Potential Problems Will be Absent, Minimized or Managed (Hip Arthroplasty)  Outcome: Ongoing (interventions implemented as appropriate)   03/21/18 0642   Goal/Outcome Evaluation   Problems Assessed (Hip Arthroplasty) all   Problems Present (Hip Arthroplasty) pain;situational response

## 2018-03-21 NOTE — PROGRESS NOTES
Name: Shreyas Lao ADMIT: 3/20/2018   : 1965  PCP: PRESTON Valera    MRN: [id number] LOS: 1 days   AGE/SEX: 52 y.o. male  ROOM: [facility]   Subjective   No CP SOA NVD. Having pain in limb and occasional spasm.    Objective   Vital Signs  Temp:  [97.7 °F (36.5 °C)-98.4 °F (36.9 °C)] 98.3 °F (36.8 °C)  Heart Rate:  [55-84] 80  Resp:  [12-18] 18  BP: ()/(57-88) 114/67  SpO2:  [92 %-100 %] 96 %  on  Flow (L/min):  [2-4] 2;   Device (Oxygen Therapy): room air  Body mass index is 44.82 kg/m².    Physical Exam   Constitutional: He is oriented to person, place, and time. He appears well-developed. No distress.   HENT:   Head: Normocephalic and atraumatic.   Eyes: Conjunctivae and EOM are normal.   Neck: Normal range of motion. Neck supple.   Cardiovascular: Normal rate, regular rhythm and intact distal pulses.    Pulmonary/Chest: Effort normal and breath sounds normal. He has no wheezes.   Abdominal: Soft. He exhibits no distension.   Musculoskeletal: He exhibits tenderness. He exhibits no edema.   Neurological: He is alert and oriented to person, place, and time.   Skin: Skin is warm and dry. He is not diaphoretic.   Psychiatric: He has a normal mood and affect. His behavior is normal.   Nursing note and vitals reviewed.      Results Review:       I reviewed the patient's new clinical results.     Results from last 7 days  Lab Units 18  0408 18  1334   WBC 10*3/mm3 10.40 7.17   HEMOGLOBIN g/dL 11.9* 14.9   PLATELETS 10*3/mm3 239 247     Results from last 7 days  Lab Units 18  0408 18  1334   SODIUM mmol/L 132* 133*   POTASSIUM mmol/L 4.7 4.7   CHLORIDE mmol/L 95* 94*   CO2 mmol/L 22.1 26.9   BUN mg/dL 15 14   CREATININE mg/dL 0.94 1.17   GLUCOSE mg/dL 166* 100*   Estimated Creatinine Clearance: 105.3 mL/min (by C-G formula based on SCr of 0.94 mg/dL).  Results from last 7 days  Lab Units 18  0408 18  1334   CALCIUM mg/dL 9.0 9.2   ALBUMIN g/dL  --  4.60          amLODIPine 10 mg Oral Nightly   aspirin 325 mg Oral Q12H   benazepril 40 mg Oral QAM   metoprolol tartrate 50 mg Oral Q12H   omeprazole 40 mg Oral QAM AC   pantoprazole 40 mg Oral QAM   pramipexole 0.125 mg Oral Nightly       sodium chloride 100 mL/hr   Diet Regular      Assessment/Plan      Active Hospital Problems (** Indicates Principal Problem)    Diagnosis Date Noted   • **Unilateral osteoarthritis resulting from hip dysplasia, left hip [M16.32] 03/20/2018      Resolved Hospital Problems    Diagnosis Date Noted Date Resolved   No resolved problems to display.     - HTN: BP stable. Can resume lasix and potassium supplements at discharge when he resumes home activity and diet.  - Tobacco Use: Did not want nicotine patch. Duonebs PRN. If has wheezes or dyspnea as outpatient, consider PFT/tiotropium.  - Hyponatremia: Mild.  - GERD: PPI  - RLS: Pramipexole  - OA L Hip sp RAUL: Per primary service.    Stable for discharge from my point of view once stable from orthopedics perspective. Will continue to follow. Please call with any questions or concerns.    Raulito Hurtado MD  [facility]  03/21/18  8:33 AM

## 2018-03-23 NOTE — PROGRESS NOTES
Discharge Planning Assessment  [facility]     Patient Name: Shreyas Lao  MRN: [id number]  Today's Date: 3/23/2018    Admit Date: 3/20/2018          Discharge Needs Assessment     Row Name 03/23/18 1658       Living Environment    Lives With spouse    Current Living Arrangements home/apartment/condo       Discharge Needs Assessment    Readmission Within the Last 30 Days no previous admission in last 30 days    Concerns to be Addressed basic needs    Discharge Facility/Level of Care Needs home with home health            Discharge Plan     Row Name 03/23/18 1659       Plan    Plan [facility]    Patient/Family in Agreement with Plan yes    Plan Comments Spoke with pt, pt would like to d/c home with [facility], referral given to Elizabeth with [facility] who states they are able to accept.     Final Note Pt d/c'ed home with [facility] and family support.        Destination     No service coordination in this encounter.      Durable Medical Equipment     No service coordination in this encounter.      Dialysis/Infusion     No service coordination in this encounter.      Home Medical Care - Selection Complete     Service Request Status Selected Specialties Address Phone Number Fax Number    [facility] [place] Selected Home Health Services [address] [address] [phone]      Social Care     No service coordination in this encounter.        Expected Discharge Date and Time     Expected Discharge Date Expected Discharge Time    Mar 21, 2018               Demographic Summary    No documentation.           Functional Status    No documentation.           Psychosocial    No documentation.           Abuse/Neglect    No documentation.           Legal    No documentation.           Substance Abuse    No documentation.           Patient Forms    No documentation.         Tami Gregorio RN

## 2019-03-09 ENCOUNTER — APPOINTMENT (OUTPATIENT)
Dept: CT IMAGING | Facility: HOSPITAL | Age: 54
End: 2019-03-09

## 2019-03-09 ENCOUNTER — HOSPITAL ENCOUNTER (EMERGENCY)
Facility: HOSPITAL | Age: 54
Discharge: HOME | End: 2019-03-10
Attending: EMERGENCY MEDICINE | Admitting: EMERGENCY MEDICINE

## 2019-03-09 DIAGNOSIS — K80.20 CALCULUS OF GALLBLADDER WITHOUT CHOLECYSTITIS WITHOUT OBSTRUCTION: Primary | ICD-10-CM

## 2019-03-09 DIAGNOSIS — R10.10 UPPER ABDOMINAL PAIN: ICD-10-CM

## 2019-03-09 LAB
ALBUMIN SERPL-MCNC: 4.2 G/DL (ref 3.5–5.2)
ALBUMIN/GLOB SERPL: 1.5 G/DL
ALP SERPL-CCNC: 88 U/L (ref 39–117)
ALT SERPL W P-5'-P-CCNC: 19 U/L (ref 1–41)
ANION GAP SERPL CALCULATED.3IONS-SCNC: 12.5 MMOL/L
AST SERPL-CCNC: 12 U/L (ref 1–40)
BASOPHILS # BLD AUTO: 0.05 10*3/MM3 (ref 0–0.2)
BASOPHILS NFR BLD AUTO: 0.6 % (ref 0–1.5)
BILIRUB SERPL-MCNC: 0.3 MG/DL (ref 0.1–1.2)
BUN BLD-MCNC: 12 MG/DL (ref 6–20)
BUN/CREAT SERPL: 12.8 (ref 7–25)
CALCIUM SPEC-SCNC: 9.7 MG/DL (ref 8.6–10.5)
CHLORIDE SERPL-SCNC: 103 MMOL/L (ref 98–107)
CO2 SERPL-SCNC: 25.5 MMOL/L (ref 22–29)
CREAT BLD-MCNC: 0.94 MG/DL (ref 0.76–1.27)
DEPRECATED RDW RBC AUTO: 43 FL (ref 37–54)
EOSINOPHIL # BLD AUTO: 0.95 10*3/MM3 (ref 0–0.4)
EOSINOPHIL NFR BLD AUTO: 10.7 % (ref 0.3–6.2)
ERYTHROCYTE [DISTWIDTH] IN BLOOD BY AUTOMATED COUNT: 13.2 % (ref 12.3–15.4)
GFR SERPL CREATININE-BSD FRML MDRD: 84 ML/MIN/1.73
GLOBULIN UR ELPH-MCNC: 2.8 GM/DL
GLUCOSE BLD-MCNC: 139 MG/DL (ref 65–99)
HCT VFR BLD AUTO: 44.6 % (ref 37.5–51)
HGB BLD-MCNC: 15.4 G/DL (ref 13–17.7)
HOLD SPECIMEN: NORMAL
HOLD SPECIMEN: NORMAL
IMM GRANULOCYTES # BLD AUTO: 0.02 10*3/MM3 (ref 0–0.05)
IMM GRANULOCYTES NFR BLD AUTO: 0.2 % (ref 0–0.5)
LIPASE SERPL-CCNC: 18 U/L (ref 13–60)
LYMPHOCYTES # BLD AUTO: 0.71 10*3/MM3 (ref 0.7–3.1)
LYMPHOCYTES NFR BLD AUTO: 8 % (ref 19.6–45.3)
MCH RBC QN AUTO: 30.9 PG (ref 26.6–33)
MCHC RBC AUTO-ENTMCNC: 34.5 G/DL (ref 31.5–35.7)
MCV RBC AUTO: 89.4 FL (ref 79–97)
MONOCYTES # BLD AUTO: 0.41 10*3/MM3 (ref 0.1–0.9)
MONOCYTES NFR BLD AUTO: 4.6 % (ref 5–12)
NEUTROPHILS # BLD AUTO: 6.76 10*3/MM3 (ref 1.4–7)
NEUTROPHILS NFR BLD AUTO: 75.9 % (ref 42.7–76)
NRBC BLD AUTO-RTO: 0 /100 WBC (ref 0–0)
PLATELET # BLD AUTO: 307 10*3/MM3 (ref 140–450)
PMV BLD AUTO: 9.3 FL (ref 6–12)
POTASSIUM BLD-SCNC: 4.5 MMOL/L (ref 3.5–5.2)
PROT SERPL-MCNC: 7 G/DL (ref 6–8.5)
RBC # BLD AUTO: 4.99 10*6/MM3 (ref 4.14–5.8)
SODIUM BLD-SCNC: 141 MMOL/L (ref 136–145)
TROPONIN T SERPL-MCNC: <0.01 NG/ML (ref 0–0.03)
WBC NRBC COR # BLD: 8.9 10*3/MM3 (ref 3.4–10.8)
WHOLE BLOOD HOLD SPECIMEN: NORMAL
WHOLE BLOOD HOLD SPECIMEN: NORMAL

## 2019-03-09 PROCEDURE — 93005 ELECTROCARDIOGRAM TRACING: CPT

## 2019-03-09 PROCEDURE — 71275 CT ANGIOGRAPHY CHEST: CPT

## 2019-03-09 PROCEDURE — 99285 EMERGENCY DEPT VISIT HI MDM: CPT

## 2019-03-09 PROCEDURE — 25010000002 ONDANSETRON PER 1 MG: Performed by: EMERGENCY MEDICINE

## 2019-03-09 PROCEDURE — 25010000002 HYDROMORPHONE PER 4 MG: Performed by: EMERGENCY MEDICINE

## 2019-03-09 PROCEDURE — 84484 ASSAY OF TROPONIN QUANT: CPT | Performed by: EMERGENCY MEDICINE

## 2019-03-09 PROCEDURE — 25010000002 HYDROMORPHONE 1 MG/ML SOLUTION: Performed by: EMERGENCY MEDICINE

## 2019-03-09 PROCEDURE — 0 IOPAMIDOL PER 1 ML: Performed by: EMERGENCY MEDICINE

## 2019-03-09 PROCEDURE — 93005 ELECTROCARDIOGRAM TRACING: CPT | Performed by: EMERGENCY MEDICINE

## 2019-03-09 PROCEDURE — 83690 ASSAY OF LIPASE: CPT | Performed by: EMERGENCY MEDICINE

## 2019-03-09 PROCEDURE — 93010 ELECTROCARDIOGRAM REPORT: CPT | Performed by: INTERNAL MEDICINE

## 2019-03-09 PROCEDURE — 96361 HYDRATE IV INFUSION ADD-ON: CPT

## 2019-03-09 PROCEDURE — 74177 CT ABD & PELVIS W/CONTRAST: CPT

## 2019-03-09 PROCEDURE — 85025 COMPLETE CBC W/AUTO DIFF WBC: CPT | Performed by: EMERGENCY MEDICINE

## 2019-03-09 PROCEDURE — 80053 COMPREHEN METABOLIC PANEL: CPT | Performed by: EMERGENCY MEDICINE

## 2019-03-09 PROCEDURE — 96376 TX/PRO/DX INJ SAME DRUG ADON: CPT

## 2019-03-09 PROCEDURE — 96374 THER/PROPH/DIAG INJ IV PUSH: CPT

## 2019-03-09 PROCEDURE — 96375 TX/PRO/DX INJ NEW DRUG ADDON: CPT

## 2019-03-09 RX ORDER — ONDANSETRON 2 MG/ML
4 INJECTION INTRAMUSCULAR; INTRAVENOUS ONCE
Status: COMPLETED | OUTPATIENT
Start: 2019-03-09 | End: 2019-03-09

## 2019-03-09 RX ORDER — SODIUM CHLORIDE 0.9 % (FLUSH) 0.9 %
10 SYRINGE (ML) INJECTION AS NEEDED
Status: DISCONTINUED | OUTPATIENT
Start: 2019-03-09 | End: 2019-03-10 | Stop reason: HOSPADM

## 2019-03-09 RX ORDER — IBUPROFEN 800 MG/1
800 TABLET ORAL EVERY 6 HOURS PRN
COMMUNITY

## 2019-03-09 RX ORDER — PAROXETINE HYDROCHLORIDE 20 MG/1
20 TABLET, FILM COATED ORAL EVERY MORNING
COMMUNITY

## 2019-03-09 RX ORDER — HYDROMORPHONE HYDROCHLORIDE 1 MG/ML
0.5 INJECTION, SOLUTION INTRAMUSCULAR; INTRAVENOUS; SUBCUTANEOUS ONCE
Status: COMPLETED | OUTPATIENT
Start: 2019-03-09 | End: 2019-03-09

## 2019-03-09 RX ADMIN — SODIUM CHLORIDE 1000 ML: 9 INJECTION, SOLUTION INTRAVENOUS at 22:16

## 2019-03-09 RX ADMIN — HYDROMORPHONE HYDROCHLORIDE 1 MG: 1 INJECTION, SOLUTION INTRAMUSCULAR; INTRAVENOUS; SUBCUTANEOUS at 22:49

## 2019-03-09 RX ADMIN — HYDROMORPHONE HYDROCHLORIDE 0.5 MG: 1 INJECTION, SOLUTION INTRAMUSCULAR; INTRAVENOUS; SUBCUTANEOUS at 22:18

## 2019-03-09 RX ADMIN — ONDANSETRON 4 MG: 2 INJECTION INTRAMUSCULAR; INTRAVENOUS at 22:18

## 2019-03-09 RX ADMIN — IOPAMIDOL 95 ML: 755 INJECTION, SOLUTION INTRAVENOUS at 23:24

## 2019-03-10 ENCOUNTER — APPOINTMENT (OUTPATIENT)
Dept: ULTRASOUND IMAGING | Facility: HOSPITAL | Age: 54
End: 2019-03-10

## 2019-03-10 VITALS
RESPIRATION RATE: 15 BRPM | BODY MASS INDEX: 40.12 KG/M2 | TEMPERATURE: 97.7 F | OXYGEN SATURATION: 96 % | DIASTOLIC BLOOD PRESSURE: 77 MMHG | HEIGHT: 64 IN | SYSTOLIC BLOOD PRESSURE: 145 MMHG | WEIGHT: 235 LBS | HEART RATE: 70 BPM

## 2019-03-10 PROCEDURE — 76705 ECHO EXAM OF ABDOMEN: CPT

## 2019-03-10 RX ORDER — HYDROCODONE BITARTRATE AND ACETAMINOPHEN 7.5; 325 MG/1; MG/1
1 TABLET ORAL EVERY 4 HOURS PRN
Qty: 15 TABLET | Refills: 0 | Status: SHIPPED | OUTPATIENT
Start: 2019-03-10 | End: 2019-04-27 | Stop reason: HOSPADM

## 2019-03-10 RX ORDER — ONDANSETRON 4 MG/1
4 TABLET, ORALLY DISINTEGRATING ORAL EVERY 6 HOURS PRN
Qty: 10 TABLET | Refills: 0 | Status: SHIPPED | OUTPATIENT
Start: 2019-03-10

## 2019-03-10 NOTE — ED NOTES
Pt c/o substernal chest pain radiating to abdomen, multiple episodes of N/V. NTGx3 decreased from 8/10 to 6/10.      Osei Flores, RN  03/09/19 6363

## 2019-03-10 NOTE — ED PROVIDER NOTES
" EMERGENCY DEPARTMENT ENCOUNTER    CHIEF COMPLAINT  Chief Complaint: Chest Pain  History given by: Patient  History limited by:   Room Number: 22/22  PMD: Sangeetha Milan APRN      HPI:  Pt is a 53 y.o. male who presents complaining of lower substernal chest pain that began tonight a 1900 while at rest. Pt also reports some nausea and vomiting. Pt had 3x NTG which did not change the pain      Duration: 2 hours  Onset: sudden  Timing: constant  Location: lower substernal  Radiation: none  Quality: \"pain\"  Intensity/Severity: moderate  Progression: unchanged  Associated Symptoms: nausea, vomiting  Aggravating Factors: none  Alleviating Factors: none  Previous Episodes: none  Treatment before arrival: took NTG with EMS    PAST MEDICAL HISTORY  Active Ambulatory Problems     Diagnosis Date Noted   • Osteoarthritis resulting from right hip dysplasia 02/04/2017   • COPD (chronic obstructive pulmonary disease) (CMS/HCC) 10/31/2017   • HTN (hypertension) 10/31/2017   • GERD (gastroesophageal reflux disease) 10/31/2017   • Unilateral osteoarthritis resulting from hip dysplasia, left hip 03/20/2018     Resolved Ambulatory Problems     Diagnosis Date Noted   • Arthritis of right hip 10/31/2017     Past Medical History:   Diagnosis Date   • Abdominal pain    • Acid reflux    • Benign essential hypertension    • Bradycardia    • Chest pain    • COPD (chronic obstructive pulmonary disease) (CMS/HCC)    • Cough    • Dyslipidemia    • Fatigue    • Hip dysplasia    • Hyperlipidemia    • Hypertension    • Infection of skin    • Infection of tooth    • Obesity    • Osteoarthritis    • Physical exam    • Pleurisy    • Pneumonia    • Preoperative clearance    • Radicular pain of right lower extremity    • Right hip pain    • Risk factors for obstructive sleep apnea    • Sciatic pain    • SOB (shortness of breath)    • Tobacco abuse    • Weight gain, abnormal        PAST SURGICAL HISTORY  Past Surgical History:   Procedure Laterality " Date   • CARDIAC CATHETERIZATION  01/02/2010    Completed by Dr. Osei Stevens which showed no significant stenosis of any of the coronary arteries   • TOTAL HIP ARTHROPLASTY Right 10/31/2017    Procedure: RIGHT TOTAL HIP ARTHROPLASTY POSTERIOR;  Surgeon: Jenna Valdez MD;  Location: [facility];  Service:    • TOTAL HIP ARTHROPLASTY Left 3/20/2018    Procedure: LEFT TOTAL HIP ARTHROPLASTY;  Surgeon: Jenna Valdez MD;  Location: [facility];  Service: Orthopedics       FAMILY HISTORY  Family History   Problem Relation Age of Onset   • Heart disease Mother    • Heart disease Father    • Cancer Father    • Cancer Paternal Grandfather    • Diabetes Other    • Stroke Other    • Malig Hyperthermia Neg Hx        SOCIAL HISTORY  Social History     Socioeconomic History   • Marital status:      Spouse name: Not on file   • Number of children: Not on file   • Years of education: Not on file   • Highest education level: Not on file   Social Needs   • Financial resource strain: Not on file   • Food insecurity - worry: Not on file   • Food insecurity - inability: Not on file   • Transportation needs - medical: Not on file   • Transportation needs - non-medical: Not on file   Occupational History   • Not on file   Tobacco Use   • Smoking status: Heavy Tobacco Smoker     Packs/day: 1.00     Years: 30.00     Pack years: 30.00     Types: Cigarettes   • Smokeless tobacco: Never Used   • Tobacco comment: since age 12   Substance and Sexual Activity   • Alcohol use: No   • Drug use: No     Comment: OCCAS   • Sexual activity: Defer   Other Topics Concern   • Not on file   Social History Narrative   • Not on file       ALLERGIES  Patient has no known allergies.    REVIEW OF SYSTEMS  Review of Systems   Constitutional: Negative for activity change, appetite change and fever.   HENT: Negative for congestion and sore throat.    Eyes: Negative.    Respiratory: Negative for cough and shortness of breath.     Cardiovascular: Positive for chest pain. Negative for leg swelling.   Gastrointestinal: Positive for nausea and vomiting. Negative for abdominal pain and diarrhea.   Endocrine: Negative.    Genitourinary: Negative for decreased urine volume and dysuria.   Musculoskeletal: Negative for neck pain.   Skin: Negative for rash and wound.   Allergic/Immunologic: Negative.    Neurological: Negative for weakness, numbness and headaches.   Hematological: Negative.    Psychiatric/Behavioral: Negative.    All other systems reviewed and are negative.      PHYSICAL EXAM  ED Triage Vitals   Temp Heart Rate Resp BP SpO2   03/09/19 2133 03/09/19 2121 03/09/19 2121 03/09/19 2121 03/09/19 2121   97.9 °F (36.6 °C) 88 18 128/60 100 %      Temp src Heart Rate Source Patient Position BP Location FiO2 (%)   03/09/19 2133 03/09/19 2121 -- -- --   Tympanic Monitor          Physical Exam   Constitutional: He is oriented to person, place, and time. He appears distressed (moderate due to pain).   HENT:   Head: Normocephalic and atraumatic.   Eyes: EOM are normal. Pupils are equal, round, and reactive to light.   pterygium   Neck: Normal range of motion. Neck supple.   Cardiovascular: Normal rate, regular rhythm and normal heart sounds.   Pulmonary/Chest: Effort normal and breath sounds normal. No respiratory distress. He exhibits tenderness (sub xiphoid).   Abdominal: Soft. There is tenderness (epigastric). There is no rebound and no guarding.   Musculoskeletal: Normal range of motion. He exhibits no edema.   Neurological: He is alert and oriented to person, place, and time. He has normal sensation and normal strength.   Skin: Skin is warm and dry.   Multiple areas of excoriation to bilateral forearm   Psychiatric: Mood and affect normal.   Nursing note and vitals reviewed.      LAB RESULTS  Lab Results (last 24 hours)     Procedure Component Value Units Date/Time    CBC & Differential [[id number]] Collected:  03/09/19 2151    Specimen:  Blood  Updated:  03/09/19 2202    Narrative:       The following orders were created for panel order CBC & Differential.  Procedure                               Abnormality         Status                     ---------                               -----------         ------                     CBC Auto Differential[[id number]]        Abnormal            Final result                 Please view results for these tests on the individual orders.    Comprehensive Metabolic Panel [[id number]]  (Abnormal) Collected:  03/09/19 2151    Specimen:  Blood from Arm, Right Updated:  03/09/19 2231     Glucose 139 mg/dL      BUN 12 mg/dL      Creatinine 0.94 mg/dL      Sodium 141 mmol/L      Potassium 4.5 mmol/L      Chloride 103 mmol/L      CO2 25.5 mmol/L      Calcium 9.7 mg/dL      Total Protein 7.0 g/dL      Albumin 4.20 g/dL      ALT (SGPT) 19 U/L      AST (SGOT) 12 U/L      Alkaline Phosphatase 88 U/L      Total Bilirubin 0.3 mg/dL      eGFR Non African Amer 84 mL/min/1.73      Globulin 2.8 gm/dL      A/G Ratio 1.5 g/dL      BUN/Creatinine Ratio 12.8     Anion Gap 12.5 mmol/L     Narrative:       GFR Normal >60  Chronic Kidney Disease <60  Kidney Failure <15    Lipase [[id number]]  (Normal) Collected:  03/09/19 2151    Specimen:  Blood from Arm, Right Updated:  03/09/19 2231     Lipase 18 U/L     Troponin [[id number]]  (Normal) Collected:  03/09/19 2151    Specimen:  Blood from Arm, Right Updated:  03/09/19 2231     Troponin T <0.010 ng/mL     Narrative:       Troponin T Reference Range:  <= 0.03 ng/mL-   Negative for AMI  >0.03 ng/mL-     Abnormal for myocardial necrosis.  Clinicians would have to utilize clinical acumen, EKG, Troponin and serial changes to determine if it is an Acute Myocardial Infarction or myocardial injury due to an underlying chronic condition.     CBC Auto Differential [[id number]]  (Abnormal) Collected:  03/09/19 2151    Specimen:  Blood from Arm, Right Updated:  03/09/19 2202     WBC 8.90 10*3/mm3      RBC  4.99 10*6/mm3      Hemoglobin 15.4 g/dL      Hematocrit 44.6 %      MCV 89.4 fL      MCH 30.9 pg      MCHC 34.5 g/dL      RDW 13.2 %      RDW-SD 43.0 fl      MPV 9.3 fL      Platelets 307 10*3/mm3      Neutrophil % 75.9 %      Lymphocyte % 8.0 %      Monocyte % 4.6 %      Eosinophil % 10.7 %      Basophil % 0.6 %      Immature Grans % 0.2 %      Neutrophils, Absolute 6.76 10*3/mm3      Lymphocytes, Absolute 0.71 10*3/mm3      Monocytes, Absolute 0.41 10*3/mm3      Eosinophils, Absolute 0.95 10*3/mm3      Basophils, Absolute 0.05 10*3/mm3      Immature Grans, Absolute 0.02 10*3/mm3      nRBC 0.0 /100 WBC           I ordered the above labs and reviewed the results    RADIOLOGY  US Gallbladder   Final Result       1. Cholelithiasis, with mild gallbladder wall thickening. Early acute   cholecystitis cannot be excluded. Diagnosis remains uncertain, HIDA scan   could be performed.   2. Hepatomegaly and diffuse hepatic steatosis.       This report was finalized on 3/10/2019 1:34 AM by Dr. Lani Mcwilliams M.D.          CT Angiogram Chest With Contrast   Final Result       1. No acute pulmonary thromboembolus seen.   2. Thoracic aorta is normal in caliber, and there is no evidence of   dissection.   3. Mosaic attenuation seen throughout both lungs. The appearance is   nonspecific, but can be associated with air trapping and nonspecific   pneumonitis.   4. Probable focal area of consolidation seen within the left lower lobe.   This may reflect some atelectasis or scarring, but short-term CT   follow-up in 3 months is recommended.       Radiation dose reduction techniques were utilized, including automated   exposure control and exposure modulation based on body size.       This report was finalized on 3/10/2019 12:29 AM by Dr. Lani Mcwilliams M.D.          CT Abdomen Pelvis With Contrast   Final Result       1. Patient does have cholelithiasis. There is some questionable wall   thickening, although this may be related  to motion artifact through this   area. If there is concern for gallbladder pathology, correlation with   gallbladder ultrasound is suggested.   2. Hepatomegaly.   3. The abdominal aorta is normal in caliber, and there is no evidence of   dissection.       Radiation dose reduction techniques were utilized, including automated   exposure control and exposure modulation based on body size.       This report was finalized on 3/10/2019 12:22 AM by Dr. Lani Mcwilliams M.D.               I ordered the above noted radiological studies. Interpreted by radiologist. Reviewed by me in PACS.       PROCEDURES  Procedures  EKG          EKG time: 2139  Rhythm/Rate: NSR, 63BPM  P waves and MN: nml  QRS, axis: nml   ST and T waves: nml     Interpreted Contemporaneously by me, independently viewed  unchanged compared to prior 3/19/18      PROGRESS AND CONSULTS     9:59 PM  Ordered blood work, EKG. Dilaudid/Zofran ordered for pain.   10:49 PM  Pt reports that pain is still present. Will repeat Dilaudid. Informed of plan to get CT chest and abd for further evaluation.  12:40 AM  Ordered Gallbladder US.   1:50 AM  Rechecked with pt. Informed of his US showing cholelithiasis and offered admission vs discharge and outpatient follow-up. Pt would like to be discharged and follow-up. RTER warnings given for any new or worsening sxs.       MEDICAL DECISION MAKING  Results were reviewed/discussed with the patient and they were also made aware of online access. Pt also made aware that some labs, such as cultures, will not be resulted during ER visit and follow up with PMD is necessary.     MDM  Number of Diagnoses or Management Options  Calculus of gallbladder without cholecystitis without obstruction:   Upper abdominal pain:      Amount and/or Complexity of Data Reviewed  Clinical lab tests: ordered and reviewed (negative)  Tests in the radiology section of CPT®: ordered and reviewed (Gallbladder US: cholelithiasis)  Tests in the medicine  section of CPT®: reviewed and ordered (EKG - See EKG procedure note  )  Review and summarize past medical records: yes (Last admission 1 year ago for hip replacement)           DIAGNOSIS  Final diagnoses:   Calculus of gallbladder without cholecystitis without obstruction   Upper abdominal pain       DISPOSITION  DISCHARGE    Patient discharged in stable condition.    Reviewed implications of results, diagnosis, meds, responsibility to follow up, warning signs and symptoms of possible worsening, potential complications and reasons to return to ER.    Patient/Family voiced understanding of above instructions.    Discussed plan for discharge, as there is no emergent indication for admission. Patient referred to primary care provider for BP management due to today's BP. Pt/family is agreeable and understands need for follow up and repeat testing.  Pt is aware that discharge does not mean that nothing is wrong but it indicates no emergency is present that requires admission and they must continue care with follow-up as given below or physician of their choice.     FOLLOW-UP  Aiyana Batres MD  [address] [address]  [phone]    Schedule an appointment as soon as possible for a visit            Medication List      New Prescriptions    HYDROcodone-acetaminophen 7.5-325 MG per tablet  Commonly known as:  NORCO  Take 1 tablet by mouth Every 4 (Four) Hours As Needed for Severe Pain .     ondansetron ODT 4 MG disintegrating tablet  Commonly known as:  ZOFRAN-ODT  Take 1 tablet by mouth Every 6 (Six) Hours As Needed for Nausea or   Vomiting.        Stop    furosemide 20 MG tablet  Commonly known as:  LASIX              Latest Documented Vital Signs:  As of 1:53 AM  BP- 158/77 HR- 68 Temp- 97.9 °F (36.6 °C) (Tympanic) O2 sat- 96%    --  Documentation assistance provided by mario Canela for Dr. Jose.  Information recorded by the mario was done at my direction and has been verified and  validated by me.         Evgeny Canela  03/10/19 0153       Chinmay Jose MD  03/11/19 1956

## 2019-03-10 NOTE — ED NOTES
EMS picked pt up from home. Pt reports having CP that started an hour and radiates to the abd. Pt reports having several episodes of vomiting. Pt has hx of hypertension.      Darline Salter RN  03/09/19 2120

## 2019-04-25 ENCOUNTER — APPOINTMENT (OUTPATIENT)
Dept: ULTRASOUND IMAGING | Facility: HOSPITAL | Age: 54
End: 2019-04-25

## 2019-04-25 ENCOUNTER — HOSPITAL ENCOUNTER (OUTPATIENT)
Facility: HOSPITAL | Age: 54
Discharge: HOME | End: 2019-04-27
Attending: EMERGENCY MEDICINE | Admitting: SURGERY

## 2019-04-25 DIAGNOSIS — K80.20 CALCULUS OF GALLBLADDER WITHOUT CHOLECYSTITIS WITHOUT OBSTRUCTION: Primary | ICD-10-CM

## 2019-04-25 LAB
ALBUMIN SERPL-MCNC: 4.3 G/DL (ref 3.5–5.2)
ALBUMIN/GLOB SERPL: 1.6 G/DL
ALP SERPL-CCNC: 95 U/L (ref 39–117)
ALT SERPL W P-5'-P-CCNC: 17 U/L (ref 1–41)
ANION GAP SERPL CALCULATED.3IONS-SCNC: 13.3 MMOL/L
AST SERPL-CCNC: 11 U/L (ref 1–40)
BASOPHILS # BLD AUTO: 0.04 10*3/MM3 (ref 0–0.2)
BASOPHILS NFR BLD AUTO: 0.5 % (ref 0–1.5)
BILIRUB SERPL-MCNC: 0.3 MG/DL (ref 0.2–1.2)
BILIRUB UR QL STRIP: NEGATIVE
BUN BLD-MCNC: 15 MG/DL (ref 6–20)
BUN/CREAT SERPL: 15.8 (ref 7–25)
CALCIUM SPEC-SCNC: 9.4 MG/DL (ref 8.6–10.5)
CHLORIDE SERPL-SCNC: 102 MMOL/L (ref 98–107)
CLARITY UR: CLEAR
CO2 SERPL-SCNC: 23.7 MMOL/L (ref 22–29)
COLOR UR: YELLOW
CREAT BLD-MCNC: 0.95 MG/DL (ref 0.76–1.27)
DEPRECATED RDW RBC AUTO: 42.2 FL (ref 37–54)
EOSINOPHIL # BLD AUTO: 0.99 10*3/MM3 (ref 0–0.4)
EOSINOPHIL NFR BLD AUTO: 11.6 % (ref 0.3–6.2)
ERYTHROCYTE [DISTWIDTH] IN BLOOD BY AUTOMATED COUNT: 12.8 % (ref 12.3–15.4)
GFR SERPL CREATININE-BSD FRML MDRD: 83 ML/MIN/1.73
GLOBULIN UR ELPH-MCNC: 2.7 GM/DL
GLUCOSE BLD-MCNC: 133 MG/DL (ref 65–99)
GLUCOSE UR STRIP-MCNC: NEGATIVE MG/DL
HCT VFR BLD AUTO: 44.7 % (ref 37.5–51)
HGB BLD-MCNC: 15.1 G/DL (ref 13–17.7)
HGB UR QL STRIP.AUTO: NEGATIVE
IMM GRANULOCYTES # BLD AUTO: 0.02 10*3/MM3 (ref 0–0.05)
IMM GRANULOCYTES NFR BLD AUTO: 0.2 % (ref 0–0.5)
KETONES UR QL STRIP: ABNORMAL
LEUKOCYTE ESTERASE UR QL STRIP.AUTO: NEGATIVE
LIPASE SERPL-CCNC: 19 U/L (ref 13–60)
LYMPHOCYTES # BLD AUTO: 0.71 10*3/MM3 (ref 0.7–3.1)
LYMPHOCYTES NFR BLD AUTO: 8.3 % (ref 19.6–45.3)
MCH RBC QN AUTO: 30.5 PG (ref 26.6–33)
MCHC RBC AUTO-ENTMCNC: 33.8 G/DL (ref 31.5–35.7)
MCV RBC AUTO: 90.3 FL (ref 79–97)
MONOCYTES # BLD AUTO: 0.35 10*3/MM3 (ref 0.1–0.9)
MONOCYTES NFR BLD AUTO: 4.1 % (ref 5–12)
NEUTROPHILS # BLD AUTO: 6.46 10*3/MM3 (ref 1.7–7)
NEUTROPHILS NFR BLD AUTO: 75.3 % (ref 42.7–76)
NITRITE UR QL STRIP: NEGATIVE
NRBC BLD AUTO-RTO: 0 /100 WBC (ref 0–0.2)
PH UR STRIP.AUTO: 6 [PH] (ref 5–8)
PLATELET # BLD AUTO: 273 10*3/MM3 (ref 140–450)
PMV BLD AUTO: 9.3 FL (ref 6–12)
POTASSIUM BLD-SCNC: 4.3 MMOL/L (ref 3.5–5.2)
PROT SERPL-MCNC: 7 G/DL (ref 6–8.5)
PROT UR QL STRIP: ABNORMAL
RBC # BLD AUTO: 4.95 10*6/MM3 (ref 4.14–5.8)
SODIUM BLD-SCNC: 139 MMOL/L (ref 136–145)
SP GR UR STRIP: >=1.03 (ref 1–1.03)
TROPONIN T SERPL-MCNC: <0.01 NG/ML (ref 0–0.03)
UROBILINOGEN UR QL STRIP: ABNORMAL
WBC NRBC COR # BLD: 8.57 10*3/MM3 (ref 3.4–10.8)

## 2019-04-25 PROCEDURE — 99284 EMERGENCY DEPT VISIT MOD MDM: CPT

## 2019-04-25 PROCEDURE — 25010000002 HYDROMORPHONE 1 MG/ML SOLUTION: Performed by: EMERGENCY MEDICINE

## 2019-04-25 PROCEDURE — 83690 ASSAY OF LIPASE: CPT | Performed by: PHYSICIAN ASSISTANT

## 2019-04-25 PROCEDURE — 93005 ELECTROCARDIOGRAM TRACING: CPT | Performed by: PHYSICIAN ASSISTANT

## 2019-04-25 PROCEDURE — 93010 ELECTROCARDIOGRAM REPORT: CPT | Performed by: INTERNAL MEDICINE

## 2019-04-25 PROCEDURE — 25010000002 ONDANSETRON PER 1 MG: Performed by: EMERGENCY MEDICINE

## 2019-04-25 PROCEDURE — 80053 COMPREHEN METABOLIC PANEL: CPT | Performed by: PHYSICIAN ASSISTANT

## 2019-04-25 PROCEDURE — 81003 URINALYSIS AUTO W/O SCOPE: CPT | Performed by: PHYSICIAN ASSISTANT

## 2019-04-25 PROCEDURE — 96374 THER/PROPH/DIAG INJ IV PUSH: CPT

## 2019-04-25 PROCEDURE — 85025 COMPLETE CBC W/AUTO DIFF WBC: CPT | Performed by: PHYSICIAN ASSISTANT

## 2019-04-25 PROCEDURE — 96376 TX/PRO/DX INJ SAME DRUG ADON: CPT

## 2019-04-25 PROCEDURE — 84484 ASSAY OF TROPONIN QUANT: CPT | Performed by: PHYSICIAN ASSISTANT

## 2019-04-25 PROCEDURE — 96375 TX/PRO/DX INJ NEW DRUG ADDON: CPT

## 2019-04-25 PROCEDURE — 76705 ECHO EXAM OF ABDOMEN: CPT

## 2019-04-25 RX ORDER — SODIUM CHLORIDE 0.9 % (FLUSH) 0.9 %
10 SYRINGE (ML) INJECTION AS NEEDED
Status: DISCONTINUED | OUTPATIENT
Start: 2019-04-25 | End: 2019-04-27 | Stop reason: HOSPADM

## 2019-04-25 RX ORDER — ONDANSETRON 2 MG/ML
4 INJECTION INTRAMUSCULAR; INTRAVENOUS ONCE
Status: COMPLETED | OUTPATIENT
Start: 2019-04-25 | End: 2019-04-25

## 2019-04-25 RX ADMIN — SODIUM CHLORIDE 1000 ML: 9 INJECTION, SOLUTION INTRAVENOUS at 22:13

## 2019-04-25 RX ADMIN — HYDROMORPHONE HYDROCHLORIDE 1 MG: 1 INJECTION, SOLUTION INTRAMUSCULAR; INTRAVENOUS; SUBCUTANEOUS at 22:13

## 2019-04-25 RX ADMIN — ONDANSETRON 4 MG: 2 INJECTION INTRAMUSCULAR; INTRAVENOUS at 22:13

## 2019-04-25 RX ADMIN — HYDROMORPHONE HYDROCHLORIDE 1 MG: 1 INJECTION, SOLUTION INTRAMUSCULAR; INTRAVENOUS; SUBCUTANEOUS at 22:55

## 2019-04-26 ENCOUNTER — APPOINTMENT (OUTPATIENT)
Dept: GENERAL RADIOLOGY | Facility: HOSPITAL | Age: 54
End: 2019-04-26

## 2019-04-26 ENCOUNTER — ANESTHESIA (OUTPATIENT)
Dept: PERIOP | Facility: HOSPITAL | Age: 54
End: 2019-04-26

## 2019-04-26 ENCOUNTER — ANESTHESIA EVENT (OUTPATIENT)
Dept: PERIOP | Facility: HOSPITAL | Age: 54
End: 2019-04-26

## 2019-04-26 PROBLEM — K80.20 CALCULUS OF GALLBLADDER WITHOUT CHOLECYSTITIS WITHOUT OBSTRUCTION: Status: ACTIVE | Noted: 2019-04-26

## 2019-04-26 LAB
ALBUMIN SERPL-MCNC: 3.8 G/DL (ref 3.5–5.2)
ALBUMIN/GLOB SERPL: 1.4 G/DL
ALP SERPL-CCNC: 89 U/L (ref 39–117)
ALT SERPL W P-5'-P-CCNC: 15 U/L (ref 1–41)
AMYLASE SERPL-CCNC: 34 U/L (ref 28–100)
ANION GAP SERPL CALCULATED.3IONS-SCNC: 8.5 MMOL/L
AST SERPL-CCNC: 12 U/L (ref 1–40)
BILIRUB SERPL-MCNC: 0.3 MG/DL (ref 0.2–1.2)
BUN BLD-MCNC: 14 MG/DL (ref 6–20)
BUN/CREAT SERPL: 17.9 (ref 7–25)
CALCIUM SPEC-SCNC: 8.6 MG/DL (ref 8.6–10.5)
CHLORIDE SERPL-SCNC: 105 MMOL/L (ref 98–107)
CO2 SERPL-SCNC: 22.5 MMOL/L (ref 22–29)
CREAT BLD-MCNC: 0.78 MG/DL (ref 0.76–1.27)
DEPRECATED RDW RBC AUTO: 43.9 FL (ref 37–54)
ERYTHROCYTE [DISTWIDTH] IN BLOOD BY AUTOMATED COUNT: 12.9 % (ref 12.3–15.4)
GFR SERPL CREATININE-BSD FRML MDRD: 105 ML/MIN/1.73
GLOBULIN UR ELPH-MCNC: 2.7 GM/DL
GLUCOSE BLD-MCNC: 115 MG/DL (ref 65–99)
HCT VFR BLD AUTO: 48.8 % (ref 37.5–51)
HGB BLD-MCNC: 15.9 G/DL (ref 13–17.7)
MCH RBC QN AUTO: 30.3 PG (ref 26.6–33)
MCHC RBC AUTO-ENTMCNC: 32.6 G/DL (ref 31.5–35.7)
MCV RBC AUTO: 93.1 FL (ref 79–97)
PLATELET # BLD AUTO: 272 10*3/MM3 (ref 140–450)
PMV BLD AUTO: 9.3 FL (ref 6–12)
POTASSIUM BLD-SCNC: 4.4 MMOL/L (ref 3.5–5.2)
PROT SERPL-MCNC: 6.5 G/DL (ref 6–8.5)
RBC # BLD AUTO: 5.24 10*6/MM3 (ref 4.14–5.8)
SODIUM BLD-SCNC: 136 MMOL/L (ref 136–145)
WBC NRBC COR # BLD: 8.85 10*3/MM3 (ref 3.4–10.8)

## 2019-04-26 PROCEDURE — 0 IOTHALAMATE 60 % SOLUTION: Performed by: SURGERY

## 2019-04-26 PROCEDURE — 25010000002 MIDAZOLAM PER 1 MG: Performed by: ANESTHESIOLOGY

## 2019-04-26 PROCEDURE — 25010000002 FENTANYL CITRATE (PF) 100 MCG/2ML SOLUTION: Performed by: ANESTHESIOLOGY

## 2019-04-26 PROCEDURE — G0378 HOSPITAL OBSERVATION PER HR: HCPCS

## 2019-04-26 PROCEDURE — 25010000002 HYDROMORPHONE PER 4 MG: Performed by: SURGERY

## 2019-04-26 PROCEDURE — 96361 HYDRATE IV INFUSION ADD-ON: CPT

## 2019-04-26 PROCEDURE — 25010000003 CEFAZOLIN IN DEXTROSE 2-4 GM/100ML-% SOLUTION: Performed by: SURGERY

## 2019-04-26 PROCEDURE — 85027 COMPLETE CBC AUTOMATED: CPT | Performed by: SURGERY

## 2019-04-26 PROCEDURE — 25010000002 DEXAMETHASONE PER 1 MG: Performed by: NURSE ANESTHETIST, CERTIFIED REGISTERED

## 2019-04-26 PROCEDURE — 25010000002 PHENYLEPHRINE PER 1 ML: Performed by: NURSE ANESTHETIST, CERTIFIED REGISTERED

## 2019-04-26 PROCEDURE — 96376 TX/PRO/DX INJ SAME DRUG ADON: CPT

## 2019-04-26 PROCEDURE — 25010000002 PROPOFOL 10 MG/ML EMULSION: Performed by: NURSE ANESTHETIST, CERTIFIED REGISTERED

## 2019-04-26 PROCEDURE — 74300 X-RAY BILE DUCTS/PANCREAS: CPT

## 2019-04-26 PROCEDURE — 96375 TX/PRO/DX INJ NEW DRUG ADDON: CPT

## 2019-04-26 PROCEDURE — 25010000002 ONDANSETRON PER 1 MG: Performed by: NURSE ANESTHETIST, CERTIFIED REGISTERED

## 2019-04-26 PROCEDURE — 99219 PR INITIAL OBSERVATION CARE/DAY 50 MINUTES: CPT | Performed by: SURGERY

## 2019-04-26 PROCEDURE — 82150 ASSAY OF AMYLASE: CPT | Performed by: SURGERY

## 2019-04-26 PROCEDURE — 80053 COMPREHEN METABOLIC PANEL: CPT | Performed by: SURGERY

## 2019-04-26 PROCEDURE — 88304 TISSUE EXAM BY PATHOLOGIST: CPT | Performed by: SURGERY

## 2019-04-26 PROCEDURE — 25010000002 HYDROMORPHONE 1 MG/ML SOLUTION: Performed by: EMERGENCY MEDICINE

## 2019-04-26 PROCEDURE — 25010000002 NEOSTIGMINE PER 0.5 MG: Performed by: NURSE ANESTHETIST, CERTIFIED REGISTERED

## 2019-04-26 PROCEDURE — 94799 UNLISTED PULMONARY SVC/PX: CPT

## 2019-04-26 PROCEDURE — 47563 LAPARO CHOLECYSTECTOMY/GRAPH: CPT | Performed by: SURGERY

## 2019-04-26 PROCEDURE — 94640 AIRWAY INHALATION TREATMENT: CPT

## 2019-04-26 RX ORDER — GLYCOPYRROLATE 0.2 MG/ML
INJECTION INTRAMUSCULAR; INTRAVENOUS AS NEEDED
Status: DISCONTINUED | OUTPATIENT
Start: 2019-04-26 | End: 2019-04-26 | Stop reason: SURG

## 2019-04-26 RX ORDER — LIDOCAINE HYDROCHLORIDE 20 MG/ML
INJECTION, SOLUTION INFILTRATION; PERINEURAL AS NEEDED
Status: DISCONTINUED | OUTPATIENT
Start: 2019-04-26 | End: 2019-04-26 | Stop reason: SURG

## 2019-04-26 RX ORDER — FLUMAZENIL 0.1 MG/ML
0.2 INJECTION INTRAVENOUS AS NEEDED
Status: DISCONTINUED | OUTPATIENT
Start: 2019-04-26 | End: 2019-04-26

## 2019-04-26 RX ORDER — CEFAZOLIN SODIUM 2 G/100ML
2 INJECTION, SOLUTION INTRAVENOUS ONCE
Status: COMPLETED | OUTPATIENT
Start: 2019-04-26 | End: 2019-04-26

## 2019-04-26 RX ORDER — IPRATROPIUM BROMIDE AND ALBUTEROL SULFATE 2.5; .5 MG/3ML; MG/3ML
3 SOLUTION RESPIRATORY (INHALATION)
Status: DISCONTINUED | OUTPATIENT
Start: 2019-04-26 | End: 2019-04-27 | Stop reason: HOSPADM

## 2019-04-26 RX ORDER — FAMOTIDINE 10 MG/ML
20 INJECTION, SOLUTION INTRAVENOUS EVERY 12 HOURS SCHEDULED
Status: DISCONTINUED | OUTPATIENT
Start: 2019-04-26 | End: 2019-04-26

## 2019-04-26 RX ORDER — OXYCODONE AND ACETAMINOPHEN 7.5; 325 MG/1; MG/1
1 TABLET ORAL EVERY 6 HOURS PRN
Qty: 20 TABLET | Refills: 0 | Status: SHIPPED | OUTPATIENT
Start: 2019-04-26

## 2019-04-26 RX ORDER — METOPROLOL TARTRATE 50 MG/1
50 TABLET, FILM COATED ORAL EVERY 12 HOURS SCHEDULED
Status: DISCONTINUED | OUTPATIENT
Start: 2019-04-26 | End: 2019-04-27 | Stop reason: HOSPADM

## 2019-04-26 RX ORDER — ONDANSETRON 2 MG/ML
4 INJECTION INTRAMUSCULAR; INTRAVENOUS EVERY 6 HOURS PRN
Status: DISCONTINUED | OUTPATIENT
Start: 2019-04-26 | End: 2019-04-27 | Stop reason: HOSPADM

## 2019-04-26 RX ORDER — PROPOFOL 10 MG/ML
VIAL (ML) INTRAVENOUS AS NEEDED
Status: DISCONTINUED | OUTPATIENT
Start: 2019-04-26 | End: 2019-04-26 | Stop reason: SURG

## 2019-04-26 RX ORDER — PAROXETINE HYDROCHLORIDE 20 MG/1
20 TABLET, FILM COATED ORAL EVERY MORNING
Status: DISCONTINUED | OUTPATIENT
Start: 2019-04-26 | End: 2019-04-27 | Stop reason: HOSPADM

## 2019-04-26 RX ORDER — HYDROMORPHONE HYDROCHLORIDE 1 MG/ML
0.5 INJECTION, SOLUTION INTRAMUSCULAR; INTRAVENOUS; SUBCUTANEOUS
Status: DISCONTINUED | OUTPATIENT
Start: 2019-04-26 | End: 2019-04-26

## 2019-04-26 RX ORDER — ONDANSETRON 2 MG/ML
4 INJECTION INTRAMUSCULAR; INTRAVENOUS ONCE AS NEEDED
Status: DISCONTINUED | OUTPATIENT
Start: 2019-04-26 | End: 2019-04-26

## 2019-04-26 RX ORDER — PRAMIPEXOLE DIHYDROCHLORIDE 0.5 MG/1
0.5 TABLET ORAL NIGHTLY
Status: DISCONTINUED | OUTPATIENT
Start: 2019-04-26 | End: 2019-04-27 | Stop reason: HOSPADM

## 2019-04-26 RX ORDER — AMLODIPINE BESYLATE 10 MG/1
10 TABLET ORAL EVERY EVENING
Status: DISCONTINUED | OUTPATIENT
Start: 2019-04-26 | End: 2019-04-27 | Stop reason: HOSPADM

## 2019-04-26 RX ORDER — HYDRALAZINE HYDROCHLORIDE 20 MG/ML
5 INJECTION INTRAMUSCULAR; INTRAVENOUS
Status: DISCONTINUED | OUTPATIENT
Start: 2019-04-26 | End: 2019-04-26

## 2019-04-26 RX ORDER — LIDOCAINE HYDROCHLORIDE 40 MG/ML
SOLUTION TOPICAL AS NEEDED
Status: DISCONTINUED | OUTPATIENT
Start: 2019-04-26 | End: 2019-04-26 | Stop reason: SURG

## 2019-04-26 RX ORDER — BUPIVACAINE HYDROCHLORIDE AND EPINEPHRINE 5; 5 MG/ML; UG/ML
INJECTION, SOLUTION PERINEURAL AS NEEDED
Status: DISCONTINUED | OUTPATIENT
Start: 2019-04-26 | End: 2019-04-26 | Stop reason: HOSPADM

## 2019-04-26 RX ORDER — FENTANYL CITRATE 50 UG/ML
50 INJECTION, SOLUTION INTRAMUSCULAR; INTRAVENOUS
Status: DISCONTINUED | OUTPATIENT
Start: 2019-04-26 | End: 2019-04-26

## 2019-04-26 RX ORDER — PROMETHAZINE HYDROCHLORIDE 25 MG/1
25 TABLET ORAL ONCE AS NEEDED
Status: DISCONTINUED | OUTPATIENT
Start: 2019-04-26 | End: 2019-04-26

## 2019-04-26 RX ORDER — FENTANYL CITRATE 50 UG/ML
50 INJECTION, SOLUTION INTRAMUSCULAR; INTRAVENOUS
Status: DISCONTINUED | OUTPATIENT
Start: 2019-04-26 | End: 2019-04-26 | Stop reason: HOSPADM

## 2019-04-26 RX ORDER — EPHEDRINE SULFATE 50 MG/ML
5 INJECTION, SOLUTION INTRAVENOUS ONCE AS NEEDED
Status: DISCONTINUED | OUTPATIENT
Start: 2019-04-26 | End: 2019-04-26

## 2019-04-26 RX ORDER — HYDROMORPHONE HYDROCHLORIDE 1 MG/ML
0.5 INJECTION, SOLUTION INTRAMUSCULAR; INTRAVENOUS; SUBCUTANEOUS
Status: DISCONTINUED | OUTPATIENT
Start: 2019-04-26 | End: 2019-04-27 | Stop reason: HOSPADM

## 2019-04-26 RX ORDER — MEPERIDINE HYDROCHLORIDE 25 MG/ML
12.5 INJECTION INTRAMUSCULAR; INTRAVENOUS; SUBCUTANEOUS
Status: DISCONTINUED | OUTPATIENT
Start: 2019-04-26 | End: 2019-04-26

## 2019-04-26 RX ORDER — PANTOPRAZOLE SODIUM 40 MG/1
40 TABLET, DELAYED RELEASE ORAL
Status: DISCONTINUED | OUTPATIENT
Start: 2019-04-27 | End: 2019-04-27 | Stop reason: HOSPADM

## 2019-04-26 RX ORDER — PROMETHAZINE HYDROCHLORIDE 25 MG/ML
12.5 INJECTION, SOLUTION INTRAMUSCULAR; INTRAVENOUS ONCE AS NEEDED
Status: DISCONTINUED | OUTPATIENT
Start: 2019-04-26 | End: 2019-04-26

## 2019-04-26 RX ORDER — SODIUM CHLORIDE 0.9 % (FLUSH) 0.9 %
3-10 SYRINGE (ML) INJECTION AS NEEDED
Status: DISCONTINUED | OUTPATIENT
Start: 2019-04-26 | End: 2019-04-27 | Stop reason: HOSPADM

## 2019-04-26 RX ORDER — LISINOPRIL 20 MG/1
20 TABLET ORAL
Status: DISCONTINUED | OUTPATIENT
Start: 2019-04-26 | End: 2019-04-27 | Stop reason: HOSPADM

## 2019-04-26 RX ORDER — NALOXONE HCL 0.4 MG/ML
0.2 VIAL (ML) INJECTION AS NEEDED
Status: DISCONTINUED | OUTPATIENT
Start: 2019-04-26 | End: 2019-04-26

## 2019-04-26 RX ORDER — MIDAZOLAM HYDROCHLORIDE 1 MG/ML
1 INJECTION INTRAMUSCULAR; INTRAVENOUS
Status: DISCONTINUED | OUTPATIENT
Start: 2019-04-26 | End: 2019-04-26 | Stop reason: HOSPADM

## 2019-04-26 RX ORDER — SODIUM CHLORIDE 0.9 % (FLUSH) 0.9 %
1-10 SYRINGE (ML) INJECTION AS NEEDED
Status: DISCONTINUED | OUTPATIENT
Start: 2019-04-26 | End: 2019-04-26 | Stop reason: HOSPADM

## 2019-04-26 RX ORDER — MAGNESIUM HYDROXIDE 1200 MG/15ML
LIQUID ORAL AS NEEDED
Status: DISCONTINUED | OUTPATIENT
Start: 2019-04-26 | End: 2019-04-26 | Stop reason: HOSPADM

## 2019-04-26 RX ORDER — MIDAZOLAM HYDROCHLORIDE 1 MG/ML
2 INJECTION INTRAMUSCULAR; INTRAVENOUS
Status: DISCONTINUED | OUTPATIENT
Start: 2019-04-26 | End: 2019-04-26 | Stop reason: HOSPADM

## 2019-04-26 RX ORDER — ACETAMINOPHEN 325 MG/1
650 TABLET ORAL ONCE AS NEEDED
Status: DISCONTINUED | OUTPATIENT
Start: 2019-04-26 | End: 2019-04-26

## 2019-04-26 RX ORDER — ROCURONIUM BROMIDE 10 MG/ML
INJECTION, SOLUTION INTRAVENOUS AS NEEDED
Status: DISCONTINUED | OUTPATIENT
Start: 2019-04-26 | End: 2019-04-26 | Stop reason: SURG

## 2019-04-26 RX ORDER — SODIUM CHLORIDE 9 MG/ML
100 INJECTION, SOLUTION INTRAVENOUS CONTINUOUS
Status: DISCONTINUED | OUTPATIENT
Start: 2019-04-26 | End: 2019-04-27 | Stop reason: HOSPADM

## 2019-04-26 RX ORDER — FAMOTIDINE 10 MG/ML
20 INJECTION, SOLUTION INTRAVENOUS ONCE
Status: DISCONTINUED | OUTPATIENT
Start: 2019-04-26 | End: 2019-04-26 | Stop reason: HOSPADM

## 2019-04-26 RX ORDER — HYDROCODONE BITARTRATE AND ACETAMINOPHEN 7.5; 325 MG/1; MG/1
1 TABLET ORAL ONCE AS NEEDED
Status: DISCONTINUED | OUTPATIENT
Start: 2019-04-26 | End: 2019-04-26

## 2019-04-26 RX ORDER — LIDOCAINE HYDROCHLORIDE 10 MG/ML
0.5 INJECTION, SOLUTION EPIDURAL; INFILTRATION; INTRACAUDAL; PERINEURAL ONCE AS NEEDED
Status: DISCONTINUED | OUTPATIENT
Start: 2019-04-26 | End: 2019-04-26 | Stop reason: HOSPADM

## 2019-04-26 RX ORDER — SODIUM CHLORIDE 0.9 % (FLUSH) 0.9 %
3 SYRINGE (ML) INJECTION EVERY 12 HOURS SCHEDULED
Status: DISCONTINUED | OUTPATIENT
Start: 2019-04-26 | End: 2019-04-27 | Stop reason: HOSPADM

## 2019-04-26 RX ORDER — DIPHENHYDRAMINE HCL 25 MG
25 CAPSULE ORAL
Status: DISCONTINUED | OUTPATIENT
Start: 2019-04-26 | End: 2019-04-26

## 2019-04-26 RX ORDER — ONDANSETRON 2 MG/ML
INJECTION INTRAMUSCULAR; INTRAVENOUS AS NEEDED
Status: DISCONTINUED | OUTPATIENT
Start: 2019-04-26 | End: 2019-04-26 | Stop reason: SURG

## 2019-04-26 RX ORDER — SODIUM CHLORIDE, SODIUM LACTATE, POTASSIUM CHLORIDE, CALCIUM CHLORIDE 600; 310; 30; 20 MG/100ML; MG/100ML; MG/100ML; MG/100ML
9 INJECTION, SOLUTION INTRAVENOUS CONTINUOUS
Status: DISCONTINUED | OUTPATIENT
Start: 2019-04-26 | End: 2019-04-26

## 2019-04-26 RX ORDER — OXYCODONE AND ACETAMINOPHEN 7.5; 325 MG/1; MG/1
1 TABLET ORAL ONCE AS NEEDED
Status: DISCONTINUED | OUTPATIENT
Start: 2019-04-26 | End: 2019-04-26

## 2019-04-26 RX ORDER — ALBUTEROL SULFATE 2.5 MG/3ML
2.5 SOLUTION RESPIRATORY (INHALATION) ONCE AS NEEDED
Status: DISCONTINUED | OUTPATIENT
Start: 2019-04-26 | End: 2019-04-26

## 2019-04-26 RX ORDER — NALOXONE HCL 0.4 MG/ML
0.4 VIAL (ML) INJECTION
Status: DISCONTINUED | OUTPATIENT
Start: 2019-04-26 | End: 2019-04-27 | Stop reason: HOSPADM

## 2019-04-26 RX ORDER — OXYCODONE HYDROCHLORIDE AND ACETAMINOPHEN 5; 325 MG/1; MG/1
1 TABLET ORAL EVERY 4 HOURS PRN
Status: DISCONTINUED | OUTPATIENT
Start: 2019-04-26 | End: 2019-04-27 | Stop reason: HOSPADM

## 2019-04-26 RX ORDER — SODIUM CHLORIDE, SODIUM LACTATE, POTASSIUM CHLORIDE, CALCIUM CHLORIDE 600; 310; 30; 20 MG/100ML; MG/100ML; MG/100ML; MG/100ML
INJECTION, SOLUTION INTRAVENOUS CONTINUOUS PRN
Status: DISCONTINUED | OUTPATIENT
Start: 2019-04-26 | End: 2019-04-26 | Stop reason: SURG

## 2019-04-26 RX ORDER — PROMETHAZINE HYDROCHLORIDE 25 MG/1
25 SUPPOSITORY RECTAL ONCE AS NEEDED
Status: DISCONTINUED | OUTPATIENT
Start: 2019-04-26 | End: 2019-04-26

## 2019-04-26 RX ORDER — EPHEDRINE SULFATE 50 MG/ML
INJECTION, SOLUTION INTRAVENOUS AS NEEDED
Status: DISCONTINUED | OUTPATIENT
Start: 2019-04-26 | End: 2019-04-26 | Stop reason: SURG

## 2019-04-26 RX ORDER — DEXAMETHASONE SODIUM PHOSPHATE 10 MG/ML
INJECTION INTRAMUSCULAR; INTRAVENOUS AS NEEDED
Status: DISCONTINUED | OUTPATIENT
Start: 2019-04-26 | End: 2019-04-26 | Stop reason: SURG

## 2019-04-26 RX ADMIN — IPRATROPIUM BROMIDE AND ALBUTEROL SULFATE 3 ML: 2.5; .5 SOLUTION RESPIRATORY (INHALATION) at 21:22

## 2019-04-26 RX ADMIN — SODIUM CHLORIDE, PRESERVATIVE FREE 3 ML: 5 INJECTION INTRAVENOUS at 11:06

## 2019-04-26 RX ADMIN — SODIUM CHLORIDE 100 ML/HR: 9 INJECTION, SOLUTION INTRAVENOUS at 03:37

## 2019-04-26 RX ADMIN — PRAMIPEXOLE DIHYDROCHLORIDE 0.5 MG: 0.5 TABLET ORAL at 21:07

## 2019-04-26 RX ADMIN — NEOSTIGMINE METHYLSULFATE 3 MG: 1 INJECTION INTRAMUSCULAR; INTRAVENOUS; SUBCUTANEOUS at 14:28

## 2019-04-26 RX ADMIN — GLYCOPYRROLATE 0.6 MG: 0.2 INJECTION INTRAMUSCULAR; INTRAVENOUS at 14:28

## 2019-04-26 RX ADMIN — Medication 2 MG: at 13:11

## 2019-04-26 RX ADMIN — HYDROMORPHONE HYDROCHLORIDE 0.5 MG: 1 INJECTION, SOLUTION INTRAMUSCULAR; INTRAVENOUS; SUBCUTANEOUS at 21:07

## 2019-04-26 RX ADMIN — HYDROMORPHONE HYDROCHLORIDE 0.5 MG: 1 INJECTION, SOLUTION INTRAMUSCULAR; INTRAVENOUS; SUBCUTANEOUS at 16:30

## 2019-04-26 RX ADMIN — SODIUM CHLORIDE, POTASSIUM CHLORIDE, SODIUM LACTATE AND CALCIUM CHLORIDE: 600; 310; 30; 20 INJECTION, SOLUTION INTRAVENOUS at 14:09

## 2019-04-26 RX ADMIN — EPHEDRINE SULFATE 10 MG: 50 INJECTION INTRAMUSCULAR; INTRAVENOUS; SUBCUTANEOUS at 13:26

## 2019-04-26 RX ADMIN — EPHEDRINE SULFATE 10 MG: 50 INJECTION INTRAMUSCULAR; INTRAVENOUS; SUBCUTANEOUS at 13:35

## 2019-04-26 RX ADMIN — MIDAZOLAM 1 MG: 1 INJECTION INTRAMUSCULAR; INTRAVENOUS at 12:52

## 2019-04-26 RX ADMIN — SODIUM CHLORIDE, PRESERVATIVE FREE 3 ML: 5 INJECTION INTRAVENOUS at 21:07

## 2019-04-26 RX ADMIN — GLYCOPYRROLATE 0.2 MG: 0.2 INJECTION INTRAMUSCULAR; INTRAVENOUS at 13:32

## 2019-04-26 RX ADMIN — DEXAMETHASONE SODIUM PHOSPHATE 6 MG: 10 INJECTION INTRAMUSCULAR; INTRAVENOUS at 13:17

## 2019-04-26 RX ADMIN — LISINOPRIL 20 MG: 20 TABLET ORAL at 09:31

## 2019-04-26 RX ADMIN — FAMOTIDINE 20 MG: 10 INJECTION INTRAVENOUS at 11:01

## 2019-04-26 RX ADMIN — ROCURONIUM BROMIDE 50 MG: 10 INJECTION INTRAVENOUS at 13:17

## 2019-04-26 RX ADMIN — SODIUM CHLORIDE, POTASSIUM CHLORIDE, SODIUM LACTATE AND CALCIUM CHLORIDE: 600; 310; 30; 20 INJECTION, SOLUTION INTRAVENOUS at 12:25

## 2019-04-26 RX ADMIN — METOPROLOL TARTRATE 50 MG: 50 TABLET, FILM COATED ORAL at 09:31

## 2019-04-26 RX ADMIN — LIDOCAINE HYDROCHLORIDE 100 MG: 20 INJECTION, SOLUTION INFILTRATION; PERINEURAL at 13:17

## 2019-04-26 RX ADMIN — METOPROLOL TARTRATE 50 MG: 50 TABLET, FILM COATED ORAL at 21:07

## 2019-04-26 RX ADMIN — FAMOTIDINE 20 MG: 10 INJECTION INTRAVENOUS at 03:37

## 2019-04-26 RX ADMIN — SODIUM CHLORIDE, PRESERVATIVE FREE 3 ML: 5 INJECTION INTRAVENOUS at 03:37

## 2019-04-26 RX ADMIN — LIDOCAINE HYDROCHLORIDE 1 EACH: 40 SOLUTION TOPICAL at 13:19

## 2019-04-26 RX ADMIN — ONDANSETRON 4 MG: 2 INJECTION INTRAMUSCULAR; INTRAVENOUS at 14:25

## 2019-04-26 RX ADMIN — SODIUM CHLORIDE 100 ML/HR: 9 INJECTION, SOLUTION INTRAVENOUS at 18:50

## 2019-04-26 RX ADMIN — HYDROMORPHONE HYDROCHLORIDE 1 MG: 1 INJECTION, SOLUTION INTRAMUSCULAR; INTRAVENOUS; SUBCUTANEOUS at 00:54

## 2019-04-26 RX ADMIN — AMLODIPINE BESYLATE 10 MG: 10 TABLET ORAL at 18:49

## 2019-04-26 RX ADMIN — PHENYLEPHRINE HYDROCHLORIDE 100 MCG: 10 INJECTION INTRAVENOUS at 13:28

## 2019-04-26 RX ADMIN — CEFAZOLIN SODIUM 2 G: 2 INJECTION, SOLUTION INTRAVENOUS at 13:10

## 2019-04-26 RX ADMIN — PHENYLEPHRINE HYDROCHLORIDE 100 MCG: 10 INJECTION INTRAVENOUS at 14:35

## 2019-04-26 RX ADMIN — FENTANYL CITRATE 50 MCG: 50 INJECTION INTRAMUSCULAR; INTRAVENOUS at 13:17

## 2019-04-26 RX ADMIN — PROPOFOL 150 MG: 10 INJECTION, EMULSION INTRAVENOUS at 13:17

## 2019-04-26 RX ADMIN — SUGAMMADEX 200 MG: 100 INJECTION, SOLUTION INTRAVENOUS at 14:47

## 2019-04-26 RX ADMIN — IPRATROPIUM BROMIDE AND ALBUTEROL SULFATE 3 ML: 2.5; .5 SOLUTION RESPIRATORY (INHALATION) at 12:42

## 2019-04-26 NOTE — ANESTHESIA POSTPROCEDURE EVALUATION
"Patient: Shreyas Lao    Procedure Summary     Date:  04/26/19 Room / Location:  [facility] OR  / [facility] MAIN OR    Anesthesia Start:  1308 Anesthesia Stop:  1500    Procedure:  CHOLECYSTECTOMY LAPAROSCOPIC INTRAOPERATIVE CHOLANGIOGRAM (N/A Abdomen) Diagnosis:       Calculus of gallbladder without cholecystitis without obstruction      (Calculus of gallbladder without cholecystitis without obstruction [K80.20])    Surgeon:  Luis Osborne MD Provider:  Jayesh Oliveira MD    Anesthesia Type:  general ASA Status:  3          Anesthesia Type: general  Last vitals  BP   125/68 (04/26/19 1535)   Temp   37.3 °C (99.1 °F) (04/26/19 1458)   Pulse   73 (04/26/19 1535)   Resp   16 (04/26/19 1535)     SpO2   94 % (04/26/19 1535)     Post Anesthesia Care and Evaluation    Patient location during evaluation: bedside  Patient participation: complete - patient participated  Level of consciousness: sleepy but conscious  Pain score: 0  Pain management: adequate  Airway patency: patent  Anesthetic complications: No anesthetic complications    Cardiovascular status: acceptable  Respiratory status: acceptable  Hydration status: acceptable    Comments: /68 (BP Location: Left arm, Patient Position: Lying)   Pulse 73   Temp 37.3 °C (99.1 °F) (Oral)   Resp 16   Ht 162.6 cm (64.02\")   Wt 109 kg (240 lb)   SpO2 94%   BMI 41.18 kg/m²         "

## 2019-04-26 NOTE — ANESTHESIA PREPROCEDURE EVALUATION
Anesthesia Evaluation     Patient summary reviewed and Nursing notes reviewed   NPO Solid Status: > 8 hours  NPO Liquid Status: > 8 hours           Airway   Mallampati: II  Dental    (+) poor dentition        Pulmonary    (+) a smoker Current, COPD moderate, shortness of breath, wheezes,   Cardiovascular - normal exam    ECG reviewed  Patient on routine beta blocker and Beta blocker given within 24 hours of surgery    (+) hypertension well controlled 2 medications or greater, hyperlipidemia,       Neuro/Psych- negative ROS  GI/Hepatic/Renal/Endo    (+) morbid obesity, GERD,      Musculoskeletal     Abdominal   (+) obese,    Substance History - negative use     OB/GYN          Other   (+) arthritis                     Anesthesia Plan    ASA 3     general     intravenous induction   Anesthetic plan, all risks, benefits, and alternatives have been provided, discussed and informed consent has been obtained with: patient.

## 2019-04-26 NOTE — ANESTHESIA PROCEDURE NOTES
Airway  Urgency: elective    Date/Time: 4/26/2019 1:19 PM  Airway not difficult    General Information and Staff    Patient location during procedure: OR  Anesthesiologist: Jayesh Oliveira MD  CRNA: Ovidio Houser CRNA    Indications and Patient Condition  Indications for airway management: airway protection    Preoxygenated: yes  MILS not maintained throughout  Mask difficulty assessment: 1 - vent by mask    Final Airway Details  Final airway type: endotracheal airway      Successful airway: ETT  Cuffed: yes   Successful intubation technique: direct laryngoscopy  Facilitating devices/methods: Bougie and anterior pressure/BURP  Endotracheal tube insertion site: oral  Blade: Cr  Blade size: 3  ETT size (mm): 7.5  Cormack-Lehane Classification: grade IIb - view of arytenoids or posterior of glottis only  Placement verified by: chest auscultation   Cuff volume (mL): 7  Measured from: lips  ETT to lips (cm): 21  Number of attempts at approach: 1    Additional Comments  Pre O2, SIAI

## 2019-04-27 VITALS
HEART RATE: 57 BPM | OXYGEN SATURATION: 95 % | HEIGHT: 64 IN | RESPIRATION RATE: 20 BRPM | TEMPERATURE: 97.3 F | SYSTOLIC BLOOD PRESSURE: 124 MMHG | WEIGHT: 240 LBS | BODY MASS INDEX: 40.97 KG/M2 | DIASTOLIC BLOOD PRESSURE: 76 MMHG

## 2019-04-27 PROBLEM — K80.12 CALCULUS OF GALLBLADDER WITH ACUTE ON CHRONIC CHOLECYSTITIS WITHOUT OBSTRUCTION: Status: ACTIVE | Noted: 2019-04-26

## 2019-04-27 PROCEDURE — 94799 UNLISTED PULMONARY SVC/PX: CPT

## 2019-04-27 PROCEDURE — 25010000002 HYDROMORPHONE PER 4 MG: Performed by: SURGERY

## 2019-04-27 PROCEDURE — G0378 HOSPITAL OBSERVATION PER HR: HCPCS

## 2019-04-27 PROCEDURE — 99024 POSTOP FOLLOW-UP VISIT: CPT | Performed by: SURGERY

## 2019-04-27 RX ADMIN — METOPROLOL TARTRATE 50 MG: 50 TABLET, FILM COATED ORAL at 08:27

## 2019-04-27 RX ADMIN — PAROXETINE HYDROCHLORIDE 20 MG: 20 TABLET, FILM COATED ORAL at 06:42

## 2019-04-27 RX ADMIN — PANTOPRAZOLE SODIUM 40 MG: 40 TABLET, DELAYED RELEASE ORAL at 06:42

## 2019-04-27 RX ADMIN — OXYCODONE AND ACETAMINOPHEN 1 TABLET: 5; 325 TABLET ORAL at 10:10

## 2019-04-27 RX ADMIN — HYDROMORPHONE HYDROCHLORIDE 0.5 MG: 1 INJECTION, SOLUTION INTRAMUSCULAR; INTRAVENOUS; SUBCUTANEOUS at 04:50

## 2019-04-27 RX ADMIN — IPRATROPIUM BROMIDE AND ALBUTEROL SULFATE 3 ML: 2.5; .5 SOLUTION RESPIRATORY (INHALATION) at 08:29

## 2019-04-27 RX ADMIN — LISINOPRIL 20 MG: 20 TABLET ORAL at 08:27

## 2019-04-27 RX ADMIN — HYDROMORPHONE HYDROCHLORIDE 0.5 MG: 1 INJECTION, SOLUTION INTRAMUSCULAR; INTRAVENOUS; SUBCUTANEOUS at 08:20

## 2019-04-27 RX ADMIN — SODIUM CHLORIDE 100 ML/HR: 9 INJECTION, SOLUTION INTRAVENOUS at 04:49

## 2019-04-27 RX ADMIN — SODIUM CHLORIDE, PRESERVATIVE FREE 3 ML: 5 INJECTION INTRAVENOUS at 08:20

## 2019-04-29 LAB
CYTO UR: NORMAL
LAB AP CASE REPORT: NORMAL
PATH REPORT.FINAL DX SPEC: NORMAL
PATH REPORT.GROSS SPEC: NORMAL

## 2019-08-16 NOTE — PROGRESS NOTES
"  Date of Office Visit: 2017  Encounter Provider: PRESTON Altman  Place of Service: [facility] CARDIOLOGY  Patient Name: Shreyas Lao  :1965    Chief Complaint   Patient presents with   • Pre-op Exam   :     HPI: Shreyas Lao is a 51 y.o. male who presents today for perioperative cardiac risk assessment. The patient was referred by PRESTON Lewis. 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  He has been recommended by the surgeon to lose weight for a goal of 200 pounds before the hip arthroplasty should be considered.  He does have chronic right hip pain.    The patient reports a history of essential hypertension and feels that his blood pressure is well-controlled on his current medication regimen.  He also has a history of hypercholesterolemia and takes pravastatin.  He was also recently diagnosed with pneumonia and pleurisy.  He remains active.  He's been experiencing some midsternal chest pain that he describes \"like a charley horse\" both with rest and exertional activities.  When he has the chest pain and makes him want to take a deep breath.  He denies associated nausea, vomiting, numbness, tingling, or diaphoresis.  This pain does not happen on a daily basis and he cannot tell me how long it's been occurring.  The patient does experience shortness of breath with exertion that resolves with rest.  He denies paroxysmal nocturnal dyspnea, orthopnea, cough, edema, palpitations, dizziness, or syncope.    PREVIOUS RECORDS REVIEWED:    · The patient had a cardiac catheterization completed 2010 which showed no significant stenosis of any of the coronary arteries completed by Dr. Osei Stevens.  · He also had a 2-D echocardiogram at [facility] dated 2007 which showed the following: Ejection fraction 55-60%, trace mitral regurgitation, and " trace tricuspid regurgitation.      Past Medical History:   Diagnosis Date   • Abdominal pain    • Benign essential hypertension    • Bradycardia    • Chest pain    • COPD (chronic obstructive pulmonary disease)    • Cough    • Dyslipidemia    • Fatigue    • Hip dysplasia    • Hyperlipidemia    • Hypertension    • Infection of skin    • Infection of tooth    • Obesity    • Osteoarthritis    • Physical exam    • Pleurisy    • Pneumonia    • Preoperative clearance    • Radicular pain of right lower extremity    • Right hip pain    • Sciatic pain    • SOB (shortness of breath)    • Tobacco abuse    • Weight gain, abnormal        Past Surgical History:   Procedure Laterality Date   • CARDIAC CATHETERIZATION  01/02/2010    Completed by Dr. Osei Stevens which showed no significant stenosis of any of the coronary arteries   • NO PAST SURGERIES         Social History     Social History   • Marital status:      Spouse name: N/A   • Number of children: N/A   • Years of education: N/A     Occupational History   • Not on file.     Social History Main Topics   • Smoking status: Heavy Tobacco Smoker     Packs/day: 1.00     Years: 30.00     Types: Cigarettes   • Smokeless tobacco: Not on file      Comment: since age 12   • Alcohol use No   • Drug use: No   • Sexual activity: Not on file     Other Topics Concern   • Not on file     Social History Narrative       Family History   Problem Relation Age of Onset   • Heart disease Mother    • Heart disease Father    • Cancer Father    • Cancer Paternal Grandfather    • Diabetes Other    • Stroke Other        Review of Systems   Constitution: Negative for chills, diaphoresis, fever, malaise/fatigue, night sweats, weight gain and weight loss.   HENT: Negative for hearing loss, nosebleeds, sore throat and tinnitus.    Eyes: Negative for blurred vision, double vision, pain and visual disturbance.   Cardiovascular: Negative for chest pain, claudication, cyanosis, dyspnea on  exertion, irregular heartbeat, leg swelling, near-syncope, orthopnea, palpitations, paroxysmal nocturnal dyspnea and syncope.   Respiratory: Positive for cough, snoring and wheezing. Negative for hemoptysis and shortness of breath.    Endocrine: Negative for cold intolerance, heat intolerance and polyuria.   Hematologic/Lymphatic: Negative for bleeding problem. Does not bruise/bleed easily.   Skin: Negative for color change, dry skin, flushing and itching.   Musculoskeletal: Positive for joint pain and myalgias. Negative for falls, joint swelling, muscle cramps and muscle weakness.   Gastrointestinal: Negative for abdominal pain, constipation, heartburn, melena, nausea and vomiting.   Genitourinary: Negative for dysuria and hematuria.   Neurological: Positive for excessive daytime sleepiness. Negative for dizziness, light-headedness, loss of balance, numbness, paresthesias, seizures and vertigo.   Psychiatric/Behavioral: Negative for altered mental status, depression, memory loss and substance abuse. The patient does not have insomnia and is not nervous/anxious.    Allergic/Immunologic: Negative for environmental allergies.       No Known Allergies      Current Outpatient Prescriptions:   •  acetaminophen-codeine (TYLENOL #3) 300-30 MG per tablet, , Disp: , Rfl:   •  albuterol (PROVENTIL) (2.5 MG/3ML) 0.083% nebulizer solution, , Disp: , Rfl:   •  amLODIPine (NORVASC) 10 MG tablet, , Disp: , Rfl:   •  benazepril (LOTENSIN) 40 MG tablet, , Disp: , Rfl:   •  cyclobenzaprine (FLEXERIL) 10 MG tablet, , Disp: , Rfl:   •  gabapentin (NEURONTIN) 400 MG capsule, , Disp: , Rfl:   •  ibuprofen (ADVIL,MOTRIN) 800 MG tablet, , Disp: , Rfl:   •  metoprolol tartrate (LOPRESSOR) 50 MG tablet, , Disp: , Rfl:   •  omeprazole (priLOSEC) 40 MG capsule, , Disp: , Rfl:   •  phentermine (ADIPEX-P) 37.5 MG tablet, , Disp: , Rfl: 0  •  pravastatin (PRAVACHOL) 40 MG tablet, Take 40 mg by mouth Daily., Disp: , Rfl:   •  Probiotic Product  "(PROBIOTIC DAILY PO), Take  by mouth., Disp: , Rfl:   •  traMADol (ULTRAM) 50 MG tablet, Take 1 tablet by mouth Every Night., Disp: 40 tablet, Rfl: 0     Objective:     Vitals:    03/23/17 1338   BP: 120/70   BP Location: Left arm   Pulse: 68   Weight: 244 lb 6.4 oz (111 kg)   Height: 63\" (160 cm)     Body mass index is 43.29 kg/(m^2).    PHYSICAL EXAM:    Vitals Reviewed.   General Appearance: No acute distress, well developed and well nourished. Obese.   Eyes: Conjunctiva and lids: No erythema, swelling, or discharge. Sclera non-icteric.   HENT: Atraumatic, normocephalic. External eyes, ears, and nose normal. No hearing loss noted. Mucous membranes normal. Lips not cyanotic. Neck supple with no tenderness.  Respiratory: No signs of respiratory distress. Respiration rhythm and depth normal.   Clear to auscultation. No rales, crackles, rhonchi, or wheezing auscultated.   Cardiovascular:  Jugular Venous Pressure: Normal  Heart Rate and Rhythm: Normal, Heart Sounds: Normal S1 and S2. No S3 or S4 noted.  Murmurs: No murmurs noted. No rubs, thrills, or gallops.   Arterial Pulses: Carotid pulses normal. No carotid bruit noted. Posterior tibialis and dorsalis pedis pulses normal.   Lower Extremities: No edema noted.  Gastrointestinal:  Abdomen soft, non-distended, non-tender. Normal bowel sounds. No hepatomegaly.   Musculoskeletal: Normal movement of extremities  Skin and Nails: General appearance normal. No pallor, cyanosis, diaphoresis. Skin temperature normal. No clubbing of fingernails.   Psychiatric: Patient alert and oriented to person, place, and time. Speech and behavior appropriate. Normal mood and affect.       ECG 12 Lead  Date/Time: 3/23/2017 12:43 PM  Performed by: OMAYRA STILL  Authorized by: OMAYRA STILL   Comparison: compared with previous ECG from 10/21/2010  Similar to previous ECG  Rhythm: sinus rhythm  Rate: normal  BPM: 68  Conduction: conduction normal  ST Segments: ST segments normal  T " Waves: T waves normal  QRS axis: normal  Other: no other findings  Clinical impression: normal ECG              Assessment:       Diagnosis Plan   1. Encounter for pre-operative cardiovascular clearance  Adult Transthoracic Echo Complete    Stress Test With Myocardial Perfusion One Day   2. Osteoarthritis resulting from right hip dysplasia  Stress Test With Myocardial Perfusion One Day   3. Precordial pain  Adult Transthoracic Echo Complete    Stress Test With Myocardial Perfusion One Day   4. Shortness of breath  Adult Transthoracic Echo Complete    Stress Test With Myocardial Perfusion One Day   5. Essential hypertension  Adult Transthoracic Echo Complete    Stress Test With Myocardial Perfusion One Day          Plan:       The patient presents today for perioperative cardiac risk assessment for possible right hip arthroplasty in the near future.  The patient's been recommended by surgeon to lose weight with a goal weight of 200 pounds before the surgery.  The patient has a history of essential hypertension his blood pressure is well-controlled.  He does experience symptoms of chest pain and shortness of breath.  I've recommended the patient have a 2-D echocardiogram and Lexiscan Myoview stress test to be completed because of his recent symptoms.  I will also review the test results and determine if he is acceptable risk to proceed with surgery from a cardiac standpoint using the [facility] Revised Cardiac Risk Assessment.  I will then forward the finalize results to your office.    As always, it has been a pleasure to participate in your patient's care.  Thank you for the referral.      Sincerely,         PRESTON Jack                 General

## (undated) DEVICE — APPL CHLORAPREP W/TINT 26ML ORNG

## (undated) DEVICE — DRAPE,HIP,W/POUCHES,STERILE: Brand: MEDLINE

## (undated) DEVICE — CONTAINER,SPECIMEN,OR STERILE,4OZ: Brand: MEDLINE

## (undated) DEVICE — GLV SURG BIOGEL LTX PF 6 1/2

## (undated) DEVICE — ENCORE® LATEX ORTHO SIZE 6.5, STERILE LATEX POWDER-FREE SURGICAL GLOVE: Brand: ENCORE

## (undated) DEVICE — OCCLUSIVE GAUZE STRIP,3% BISMUTH TRIBROMOPHENATE IN PETROLATUM BLEND: Brand: XEROFORM

## (undated) DEVICE — ANTIBACTERIAL UNDYED BRAIDED (POLYGLACTIN 910), SYNTHETIC ABSORBABLE SUTURE: Brand: COATED VICRYL

## (undated) DEVICE — SUT VIC 1 CT1 36IN J947H

## (undated) DEVICE — GLV SURG TRIUMPH CLASSIC PF LTX 8 STRL

## (undated) DEVICE — SUT VIC 0 CT2 CR8 18IN DYED J727D

## (undated) DEVICE — PAD,ABDOMINAL,8"X10",ST,LF: Brand: MEDLINE

## (undated) DEVICE — ENDOCUT SCISSOR TIP, DISPOSABLE: Brand: RENEW

## (undated) DEVICE — MAT FLR ABSORBENT LG 4FT 10 2.5FT

## (undated) DEVICE — PROXIMATE RH ROTATING HEAD SKIN STAPLERS (35 WIDE) CONTAINS 35 STAINLESS STEEL STAPLES: Brand: PROXIMATE

## (undated) DEVICE — HEWSON SUTURE RETRIEVER: Brand: HEWSON SUTURE RETRIEVER

## (undated) DEVICE — 3M™ IOBAN™ 2 ANTIMICROBIAL INCISE DRAPE 6650EZ: Brand: IOBAN™ 2

## (undated) DEVICE — BNDG ADHS FABRC CURITY SENS 3/4X3IN LF BX/30

## (undated) DEVICE — RECIPROCATING BLADE HEAVY DUTY LONG, OFFSET  (77.6 X 0.77 X 11.2MM)

## (undated) DEVICE — SOL ISO/ALC RUB 70PCT 4OZ

## (undated) DEVICE — INTENDED FOR TISSUE SEPARATION, AND OTHER PROCEDURES THAT REQUIRE A SHARP SURGICAL BLADE TO PUNCTURE OR CUT.: Brand: BARD-PARKER ® CARBON RIB-BACK BLADES

## (undated) DEVICE — 1000 S-DRAPE TOWEL DRAPE 10/BX: Brand: STERI-DRAPE™

## (undated) DEVICE — PK HIP TOTL 40

## (undated) DEVICE — GLV SURG BIOGEL LTX PF 8

## (undated) DEVICE — DRAPE,U/ SHT,SPLIT,PLAS,STERIL: Brand: MEDLINE

## (undated) DEVICE — EXTENSION SET, MALE LUER LOCK ADAPTER WITH RETRACTABLE COLLAR

## (undated) DEVICE — DRSNG WND GEL FIBR OPTICELL AG PLS W/SLV LF 4X5IN  STRL

## (undated) DEVICE — SYR CONTRL LUERLOK 10CC

## (undated) DEVICE — SKIN PREP TRAY W/CHG: Brand: MEDLINE INDUSTRIES, INC.

## (undated) DEVICE — DECANT BG O JET

## (undated) DEVICE — CATH CHOLANG 4.5F18IN BRGNDY

## (undated) DEVICE — ENDOPATH XCEL BLADELESS TROCARS WITH STABILITY SLEEVES: Brand: ENDOPATH XCEL

## (undated) DEVICE — ENCORE® LATEX ORTHO SIZE 8, STERILE LATEX POWDER-FREE SURGICAL GLOVE: Brand: ENCORE

## (undated) DEVICE — DRAPE,REIN 53X77,STERILE: Brand: MEDLINE

## (undated) DEVICE — ENDOPOUCH RETRIEVER SPECIMEN RETRIEVAL BAGS: Brand: ENDOPOUCH RETRIEVER

## (undated) DEVICE — 3M™ STERI-STRIP™ COMPOUND BENZOIN TINCTURE 40 BAGS/CARTON 4 CARTONS/CASE C1544: Brand: 3M™ STERI-STRIP™

## (undated) DEVICE — 3M™ STERI-STRIP™ REINFORCED ADHESIVE SKIN CLOSURES, R1547, 1/2 IN X 4 IN (12 MM X 100 MM), 6 STRIPS/ENVELOPE: Brand: 3M™ STERI-STRIP™

## (undated) DEVICE — SUT MNCRYL PLS ANTIB UD 4/0 PS2 18IN

## (undated) DEVICE — DRSNG BRDR MEPILEX P/OP SIL 4X12IN

## (undated) DEVICE — SUT ETHIB 0 CT1 CR8 18IN CX21D

## (undated) DEVICE — STPCK 3WY D201 DISCOFIX

## (undated) DEVICE — CATH IV INSYTE AUTOGARD 14G 1 1/2IN ORNG

## (undated) DEVICE — VISUALIZATION SYSTEM: Brand: CLEARIFY

## (undated) DEVICE — GLV SURG TRIUMPH CLASSIC PF LTX 6.5 STRL

## (undated) DEVICE — SOL NACL 0.9PCT 100ML SGL

## (undated) DEVICE — ENDOPATH XCEL BLUNT TIP TROCARS WITH SMOOTH SLEEVES: Brand: ENDOPATH XCEL

## (undated) DEVICE — SPNG GZ WOVN 4X4IN 12PLY 10/BX STRL

## (undated) DEVICE — SOL NACL 0.9PCT 1000ML

## (undated) DEVICE — ENDOPATH XCEL UNIVERSAL TROCAR STABLILITY SLEEVES: Brand: ENDOPATH XCEL

## (undated) DEVICE — LOU LAP CHOLE: Brand: MEDLINE INDUSTRIES, INC.

## (undated) DEVICE — DRP C/ARM 41X74IN